# Patient Record
Sex: MALE | Race: WHITE | NOT HISPANIC OR LATINO | Employment: OTHER | URBAN - NONMETROPOLITAN AREA
[De-identification: names, ages, dates, MRNs, and addresses within clinical notes are randomized per-mention and may not be internally consistent; named-entity substitution may affect disease eponyms.]

---

## 2017-01-25 ENCOUNTER — TELEPHONE (OUTPATIENT)
Dept: CARDIOLOGY | Facility: CLINIC | Age: 62
End: 2017-01-25

## 2017-01-25 NOTE — TELEPHONE ENCOUNTER
"E-mail received to my  e-mail account on Monday, January 23, 2017 with the noted message:    \"I was diagnosed with 3 leg clots at [facility] what do i need to do\"     Initially I forwarded the message to [email or web address] to confirm legitimacy:  This is a legit email from [facility] My Chart.  Thanks  [facility]  [employer] Support/Service Desk  [address] [place] [address]    I then made telephone contact at which time I am informed of diagnosis of \"leg clots\" on 12/27/2016 at which time he was placed on Xarelto 15 mg bid for 21 days. He is currently maintained on Xarelto 20 mg daily and has planned follow up with PCP @ [facility].   Mr Cowart inquires as to the availability of Vascular services at  to which I confirm.     I assure  Mr Cowart that the above dose of Xarelto is appropriate for deep vein thrombosis. I strongly encourage him in maintaining follow up with his PCP.         "

## 2017-06-22 ENCOUNTER — APPOINTMENT (OUTPATIENT)
Dept: CT IMAGING | Facility: HOSPITAL | Age: 62
End: 2017-06-22

## 2017-06-22 ENCOUNTER — HOSPITAL ENCOUNTER (EMERGENCY)
Facility: HOSPITAL | Age: 62
Discharge: HOME | End: 2017-06-22
Attending: FAMILY MEDICINE | Admitting: FAMILY MEDICINE

## 2017-06-22 VITALS
RESPIRATION RATE: 18 BRPM | HEART RATE: 82 BPM | BODY MASS INDEX: 38.57 KG/M2 | WEIGHT: 240 LBS | OXYGEN SATURATION: 94 % | DIASTOLIC BLOOD PRESSURE: 63 MMHG | HEIGHT: 66 IN | SYSTOLIC BLOOD PRESSURE: 132 MMHG | TEMPERATURE: 98.2 F
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
## 2017-06-22 DIAGNOSIS — S00.03XA CONTUSION OF SCALP, INITIAL ENCOUNTER: ICD-10-CM

## 2017-06-22 PROCEDURE — 99284 EMERGENCY DEPT VISIT MOD MDM: CPT

## 2017-06-22 PROCEDURE — 25010000002 TDAP 5-2.5-18.5 LF-MCG/0.5 SUSPENSION: Performed by: FAMILY MEDICINE

## 2017-06-22 PROCEDURE — 90715 TDAP VACCINE 7 YRS/> IM: CPT | Performed by: FAMILY MEDICINE

## 2017-06-22 PROCEDURE — 90471 IMMUNIZATION ADMIN: CPT | Performed by: FAMILY MEDICINE

## 2017-06-22 PROCEDURE — 70450 CT HEAD/BRAIN W/O DYE: CPT

## 2017-06-22 PROCEDURE — 72125 CT NECK SPINE W/O DYE: CPT

## 2017-06-22 RX ORDER — ASPIRIN 325 MG
325 TABLET, DELAYED RELEASE (ENTERIC COATED) ORAL EVERY 6 HOURS PRN
Status: ON HOLD | COMMUNITY
End: 2018-09-18

## 2017-06-22 RX ORDER — ESOMEPRAZOLE MAGNESIUM 40 MG/1
40 CAPSULE, DELAYED RELEASE ORAL
COMMUNITY
End: 2019-01-24

## 2017-06-22 RX ORDER — DULOXETIN HYDROCHLORIDE 60 MG/1
60 CAPSULE, DELAYED RELEASE ORAL DAILY
COMMUNITY
End: 2020-10-12

## 2017-06-22 RX ORDER — HYDROCODONE BITARTRATE AND ACETAMINOPHEN 5; 325 MG/1; MG/1
1 TABLET ORAL EVERY 6 HOURS PRN
Qty: 15 TABLET | Refills: 0 | Status: ON HOLD | OUTPATIENT
Start: 2017-06-22 | End: 2018-09-18

## 2017-06-22 RX ORDER — ONDANSETRON 4 MG/1
4 TABLET, FILM COATED ORAL EVERY 8 HOURS PRN
Qty: 10 TABLET | Refills: 0 | Status: ON HOLD | OUTPATIENT
Start: 2017-06-22 | End: 2018-09-18

## 2017-06-22 RX ADMIN — TETANUS TOXOID, REDUCED DIPHTHERIA TOXOID AND ACELLULAR PERTUSSIS VACCINE, ADSORBED 0.5 ML: 5; 2.5; 8; 8; 2.5 SUSPENSION INTRAMUSCULAR at 19:35
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
## 2018-03-29 ENCOUNTER — TRANSCRIBE ORDERS (OUTPATIENT)
Dept: PODIATRY | Facility: CLINIC | Age: 63
End: 2018-03-29

## 2018-03-29 DIAGNOSIS — S92.354A CLOSED NONDISPLACED FRACTURE OF FIFTH METATARSAL BONE OF RIGHT FOOT, INITIAL ENCOUNTER: Primary | ICD-10-CM

## 2018-04-09 ENCOUNTER — OFFICE VISIT (OUTPATIENT)
Dept: PODIATRY | Facility: CLINIC | Age: 63
End: 2018-04-09

## 2018-04-09 VITALS
OXYGEN SATURATION: 94 % | SYSTOLIC BLOOD PRESSURE: 154 MMHG | BODY MASS INDEX: 38.86 KG/M2 | HEIGHT: 66 IN | DIASTOLIC BLOOD PRESSURE: 85 MMHG | WEIGHT: 241.8 LBS | HEART RATE: 85 BPM

## 2018-04-09 DIAGNOSIS — S92.354A CLOSED NONDISPLACED FRACTURE OF FIFTH METATARSAL BONE OF RIGHT FOOT, INITIAL ENCOUNTER: Primary | ICD-10-CM

## 2018-04-09 PROCEDURE — 28470 CLTX METATARSAL FX WO MNP EA: CPT | Performed by: PODIATRIST

## 2018-04-09 NOTE — PROGRESS NOTES
Herbert Cowart  1955  62 y.o. male     Patient presents today as a follow up from [facility] clinic on his right foot.    04/09/2018    Chief Complaint   Patient presents with   • Right Foot - [facility] follow-up       History of Present Illness    Herbert Cowart is a 62 y.o.male who presents to clinic with chief complaint of right foot injury.  Approximately 3 weeks ago he was squatting down fixing a doorway when he went to stand and felt a pop in his foot.  The sharp throbbing pain immediately.  He was unable to bear his full weight.  He proceeded to the urgent care where x-rays were taken.  He was diagnosed with a fifth metatarsal fracture and was given a fracture boot.  Is currently weightbearing as tolerated in a fracture boot.  He states that his pain is improving.  He currently rates it as a 2 out of 10.  He has no other pedal complaints.      Past Medical History:   Diagnosis Date   • Clotting disorder    • Hyperlipidemia    • Hypertension    • Plantar fasciitis          Past Surgical History:   Procedure Laterality Date   • VOCAL CORD BIOPSY     • WRIST SURGERY           Family History   Problem Relation Age of Onset   • Cancer Mother    • Osteoporosis Mother    • Psoriasis Mother    • Heart disease Father    • Diabetes Father    • Cancer Paternal Grandfather    • Diabetes Paternal Grandfather        No Known Allergies    Social History     Social History   • Marital status:      Spouse name: N/A   • Number of children: N/A   • Years of education: N/A     Occupational History   • Not on file.     Social History Main Topics   • Smoking status: Never Smoker   • Smokeless tobacco: Not on file   • Alcohol use No   • Drug use: No   • Sexual activity: Defer     Other Topics Concern   • Not on file     Social History Narrative   • No narrative on file         Current Outpatient Prescriptions   Medication Sig Dispense Refill   • aspirin  MG tablet Take 325 mg by mouth Every 6 (Six) Hours As  "Needed.     • ATENOLOL PO Take  by mouth.     • DULoxetine (CYMBALTA) 60 MG capsule Take 60 mg by mouth Daily.     • esomeprazole (nexIUM) 40 MG capsule Take 40 mg by mouth Every Morning Before Breakfast.     • HYDROcodone-acetaminophen (NORCO) 5-325 MG per tablet Take 1 tablet by mouth Every 6 (Six) Hours As Needed (pain). 15 tablet 0   • ondansetron (ZOFRAN) 4 MG tablet Take 1 tablet by mouth Every 8 (Eight) Hours As Needed for Nausea or Vomiting. 10 tablet 0   • Rosuvastatin Calcium (CRESTOR PO) Take  by mouth.       No current facility-administered medications for this visit.          OBJECTIVE    /85   Pulse 85   Ht 167.6 cm (66\")   Wt 110 kg (241 lb 12.8 oz)   SpO2 94%   BMI 39.03 kg/m²       Review of Systems   Constitutional: Negative for activity change and appetite change.   HENT: Positive for hearing loss. Negative for congestion and dental problem.    Eyes: Positive for visual disturbance. Negative for photophobia and redness.   Respiratory: Negative for apnea and chest tightness.    Cardiovascular: Negative for chest pain.   Gastrointestinal: Negative for abdominal distention and abdominal pain.   Endocrine: Negative for cold intolerance and heat intolerance.   Genitourinary: Negative for difficulty urinating and dysuria.   Musculoskeletal: Negative for arthralgias.        Right foot pain   Skin: Negative for color change and pallor.   Allergic/Immunologic: Negative for environmental allergies and food allergies.   Neurological: Negative for dizziness and facial asymmetry.   Hematological: Negative for adenopathy. Does not bruise/bleed easily.   Psychiatric/Behavioral: Negative for agitation and behavioral problems.           Physical Exam   Constitutional: He is oriented to person, place, and time. He appears well-developed and well-nourished. No distress.   HENT:   Head: Normocephalic and atraumatic.   Nose: Nose normal.   Eyes: Conjunctivae and EOM are normal. Pupils are equal, round, and " reactive to light.   Pulmonary/Chest: Effort normal. No respiratory distress. He has no wheezes.   Neurological: He is alert and oriented to person, place, and time.   Skin: He is not diaphoretic.   Psychiatric: He has a normal mood and affect. His behavior is normal.   Vitals reviewed.        Right Lower Extremity    Cardiovascular:    DP/PT pulses palpable    CFT brisk  to all digits  No erythema noted   Mild edema noted to right distal lateral foot    Musculoskeletal:  Muscle strength deferred   Pain on palpation to the distal right lateral foot    Dermatological:   Skin is warm, dry and intact      Webspaces 1 through 4 bilateral are clean, dry and intact    Neurological:   Protective sensation intact    Sensation intact to light touch      Procedures        ASSESSMENT AND PLAN    Herbert was seen today for [facility] follow-up.    Diagnoses and all orders for this visit:    Closed nondisplaced fracture of fifth metatarsal bone of right foot, initial encounter      - Comprehensive foot and ankle exam performed.   - Reviewed x-rays on disc.  Nondisplaced fifth metatarsal neck fracture.  No other acute osseous abnormality is noted.  - Continue weightbearing as tolerated in fracture boot  - Ice and elevate at home  - All questions were answered to the patients satisfaction.  - RTC 3 weeks for repeat x-rays          This document has been electronically signed by Salvador Birmingham DPM on April 10, 2018 9:17 AM     4/10/2018  9:17 AM

## 2018-04-30 ENCOUNTER — OFFICE VISIT (OUTPATIENT)
Dept: PODIATRY | Facility: CLINIC | Age: 63
End: 2018-04-30

## 2018-04-30 VITALS — HEIGHT: 66 IN | OXYGEN SATURATION: 94 % | WEIGHT: 241 LBS | HEART RATE: 74 BPM | BODY MASS INDEX: 38.73 KG/M2

## 2018-04-30 DIAGNOSIS — S92.354D CLOSED NONDISPLACED FRACTURE OF FIFTH METATARSAL BONE OF RIGHT FOOT WITH ROUTINE HEALING, SUBSEQUENT ENCOUNTER: Primary | ICD-10-CM

## 2018-04-30 PROCEDURE — 99024 POSTOP FOLLOW-UP VISIT: CPT | Performed by: PODIATRIST

## 2018-04-30 NOTE — PROGRESS NOTES
Herbert Cowart  1955  62 y.o. male     Patient presents today for recheck of his right foot with repeat x-rays.    04/30/2018     Chief Complaint   Patient presents with   • Right Foot - Follow-up       History of Present Illness    Patient presents to clinic today for follow-up of his right foot fracture.  He is currently weightbearing in a cam boot.  He denies pain today.    Past Medical History:   Diagnosis Date   • Closed nondisplaced fracture of fifth metatarsal bone of right foot with routine healing    • Clotting disorder    • Hyperlipidemia    • Hypertension    • Plantar fasciitis          Past Surgical History:   Procedure Laterality Date   • VOCAL CORD BIOPSY     • WRIST SURGERY           Family History   Problem Relation Age of Onset   • Cancer Mother    • Osteoporosis Mother    • Psoriasis Mother    • Heart disease Father    • Diabetes Father    • Cancer Paternal Grandfather    • Diabetes Paternal Grandfather        No Known Allergies    Social History     Social History   • Marital status:      Spouse name: N/A   • Number of children: N/A   • Years of education: N/A     Occupational History   • Not on file.     Social History Main Topics   • Smoking status: Never Smoker   • Smokeless tobacco: Not on file   • Alcohol use No   • Drug use: No   • Sexual activity: Defer     Other Topics Concern   • Not on file     Social History Narrative   • No narrative on file         Current Outpatient Prescriptions   Medication Sig Dispense Refill   • aspirin  MG tablet Take 325 mg by mouth Every 6 (Six) Hours As Needed.     • ATENOLOL PO Take  by mouth.     • DULoxetine (CYMBALTA) 60 MG capsule Take 60 mg by mouth Daily.     • esomeprazole (nexIUM) 40 MG capsule Take 40 mg by mouth Every Morning Before Breakfast.     • HYDROcodone-acetaminophen (NORCO) 5-325 MG per tablet Take 1 tablet by mouth Every 6 (Six) Hours As Needed (pain). 15 tablet 0   • ondansetron (ZOFRAN) 4 MG tablet Take 1 tablet by  "mouth Every 8 (Eight) Hours As Needed for Nausea or Vomiting. 10 tablet 0   • Rosuvastatin Calcium (CRESTOR PO) Take  by mouth.       No current facility-administered medications for this visit.          OBJECTIVE    Pulse 74   Ht 167.6 cm (66\")   Wt 109 kg (241 lb)   SpO2 94%   BMI 38.90 kg/m²       Review of Systems   Constitutional: Negative.    HENT: Negative for hearing loss.    Respiratory: Negative.    Cardiovascular: Negative.    Musculoskeletal: Negative.    Skin: Negative.    Psychiatric/Behavioral: Negative.            Physical Exam   Constitutional: He is oriented to person, place, and time. He appears well-developed and well-nourished.   Pulmonary/Chest: Effort normal. No respiratory distress. He has no wheezes.   Neurological: He is alert and oriented to person, place, and time.   Skin: He is not diaphoretic.   Psychiatric: He has a normal mood and affect. His behavior is normal.         Right Lower Extremity    Cardiovascular:    DP/PT pulses palpable    CFT brisk  to all digits  No erythema noted   No edema noted to right distal lateral foot    Musculoskeletal:  Muscle strength normal  Improved pain on palpation to the distal right lateral foot    Dermatological:   Skin is warm, dry and intact      Webspaces 1 through 4 bilateral are clean, dry and intact    Neurological:   Protective sensation intact    Sensation intact to light touch      Procedures        ASSESSMENT AND PLAN    Herbert was seen today for follow-up.    Diagnoses and all orders for this visit:    Closed nondisplaced fracture of fifth metatarsal bone of right foot with routine healing, subsequent encounter  -     XR Foot 3+ View Right      - X-rays taken and reviewed.  Healing fifth metatarsal neck fracture noted.  - Transition to regular shoe gear as tolerated  - All questions were answered to the patients satisfaction.  - RTC 2 months, repeat radiographs          This document has been electronically signed by Salvador Birmingham DPM " on April 30, 2018 10:45 AM     4/30/2018  10:45 AM

## 2018-09-18 ENCOUNTER — ANESTHESIA (OUTPATIENT)
Dept: GASTROENTEROLOGY | Facility: HOSPITAL | Age: 63
End: 2018-09-18

## 2018-09-18 ENCOUNTER — ANESTHESIA EVENT (OUTPATIENT)
Dept: GASTROENTEROLOGY | Facility: HOSPITAL | Age: 63
End: 2018-09-18

## 2018-09-18 ENCOUNTER — HOSPITAL ENCOUNTER (OUTPATIENT)
Facility: HOSPITAL | Age: 63
Setting detail: HOSPITAL OUTPATIENT SURGERY
Discharge: HOME | End: 2018-09-18
Attending: INTERNAL MEDICINE | Admitting: INTERNAL MEDICINE

## 2018-09-18 VITALS
BODY MASS INDEX: 37.61 KG/M2 | HEART RATE: 63 BPM | WEIGHT: 234 LBS | SYSTOLIC BLOOD PRESSURE: 127 MMHG | RESPIRATION RATE: 18 BRPM | TEMPERATURE: 97.7 F | DIASTOLIC BLOOD PRESSURE: 48 MMHG | OXYGEN SATURATION: 94 % | HEIGHT: 66 IN

## 2018-09-18 DIAGNOSIS — Z12.11 ENCOUNTER FOR SCREENING COLONOSCOPY: ICD-10-CM

## 2018-09-18 DIAGNOSIS — Z80.0 FH: GI CANCER: ICD-10-CM

## 2018-09-18 LAB — GLUCOSE BLDC GLUCOMTR-MCNC: 126 MG/DL (ref 70–130)

## 2018-09-18 PROCEDURE — 88305 TISSUE EXAM BY PATHOLOGIST: CPT | Performed by: INTERNAL MEDICINE

## 2018-09-18 PROCEDURE — 25010000002 PROPOFOL 10 MG/ML EMULSION: Performed by: NURSE ANESTHETIST, CERTIFIED REGISTERED

## 2018-09-18 PROCEDURE — 82962 GLUCOSE BLOOD TEST: CPT

## 2018-09-18 PROCEDURE — 88305 TISSUE EXAM BY PATHOLOGIST: CPT | Performed by: PATHOLOGY

## 2018-09-18 DEVICE — CLIPPING DEVICE
Type: IMPLANTABLE DEVICE | Site: SIGMOID COLON | Status: FUNCTIONAL
Brand: RESOLUTION CLIP

## 2018-09-18 RX ORDER — PROMETHAZINE HYDROCHLORIDE 25 MG/ML
12.5 INJECTION, SOLUTION INTRAMUSCULAR; INTRAVENOUS ONCE AS NEEDED
Status: DISCONTINUED | OUTPATIENT
Start: 2018-09-18 | End: 2018-09-18 | Stop reason: HOSPADM

## 2018-09-18 RX ORDER — LIDOCAINE HYDROCHLORIDE 10 MG/ML
INJECTION, SOLUTION INFILTRATION; PERINEURAL AS NEEDED
Status: DISCONTINUED | OUTPATIENT
Start: 2018-09-18 | End: 2018-09-18 | Stop reason: SURG

## 2018-09-18 RX ORDER — PROMETHAZINE HYDROCHLORIDE 25 MG/1
25 SUPPOSITORY RECTAL ONCE AS NEEDED
Status: DISCONTINUED | OUTPATIENT
Start: 2018-09-18 | End: 2018-09-18 | Stop reason: HOSPADM

## 2018-09-18 RX ORDER — CHOLECALCIFEROL (VITAMIN D3) 1250 MCG
CAPSULE ORAL
COMMUNITY
End: 2019-01-24

## 2018-09-18 RX ORDER — PROMETHAZINE HYDROCHLORIDE 25 MG/1
25 TABLET ORAL ONCE AS NEEDED
Status: DISCONTINUED | OUTPATIENT
Start: 2018-09-18 | End: 2018-09-18 | Stop reason: HOSPADM

## 2018-09-18 RX ORDER — ONDANSETRON 2 MG/ML
4 INJECTION INTRAMUSCULAR; INTRAVENOUS ONCE AS NEEDED
Status: DISCONTINUED | OUTPATIENT
Start: 2018-09-18 | End: 2018-09-18 | Stop reason: HOSPADM

## 2018-09-18 RX ORDER — DEXTROSE AND SODIUM CHLORIDE 5; .45 G/100ML; G/100ML
20 INJECTION, SOLUTION INTRAVENOUS CONTINUOUS
Status: DISCONTINUED | OUTPATIENT
Start: 2018-09-18 | End: 2018-09-18 | Stop reason: HOSPADM

## 2018-09-18 RX ORDER — PROPOFOL 10 MG/ML
VIAL (ML) INTRAVENOUS AS NEEDED
Status: DISCONTINUED | OUTPATIENT
Start: 2018-09-18 | End: 2018-09-18 | Stop reason: SURG

## 2018-09-18 RX ORDER — DEXAMETHASONE SODIUM PHOSPHATE 4 MG/ML
8 INJECTION, SOLUTION INTRA-ARTICULAR; INTRALESIONAL; INTRAMUSCULAR; INTRAVENOUS; SOFT TISSUE ONCE AS NEEDED
Status: DISCONTINUED | OUTPATIENT
Start: 2018-09-18 | End: 2018-09-18 | Stop reason: HOSPADM

## 2018-09-18 RX ORDER — ASPIRIN 81 MG/1
81 TABLET ORAL DAILY
COMMUNITY
End: 2023-02-08 | Stop reason: DRUGHIGH

## 2018-09-18 RX ADMIN — PROPOFOL 50 MG: 10 INJECTION, EMULSION INTRAVENOUS at 11:10

## 2018-09-18 RX ADMIN — PROPOFOL 100 MG: 10 INJECTION, EMULSION INTRAVENOUS at 10:55

## 2018-09-18 RX ADMIN — PROPOFOL 50 MG: 10 INJECTION, EMULSION INTRAVENOUS at 11:13

## 2018-09-18 RX ADMIN — PROPOFOL 50 MG: 10 INJECTION, EMULSION INTRAVENOUS at 11:00

## 2018-09-18 RX ADMIN — PROPOFOL 50 MG: 10 INJECTION, EMULSION INTRAVENOUS at 11:17

## 2018-09-18 RX ADMIN — DEXTROSE AND SODIUM CHLORIDE 20 ML/HR: 5; 450 INJECTION, SOLUTION INTRAVENOUS at 10:23

## 2018-09-18 RX ADMIN — LIDOCAINE HYDROCHLORIDE 50 MG: 10 INJECTION, SOLUTION INFILTRATION; PERINEURAL at 10:55

## 2018-09-18 RX ADMIN — PROPOFOL 50 MG: 10 INJECTION, EMULSION INTRAVENOUS at 11:05

## 2018-09-18 RX ADMIN — PROPOFOL 50 MG: 10 INJECTION, EMULSION INTRAVENOUS at 11:02

## 2018-09-18 NOTE — H&P
Joey Morrell DO,[facility]  Gastroenterology  Hepatology  Endoscopy  Board Certified in Internal Medicine and gastroenterology  [address], [address]  [place]. [address]  - [phone]   F - [phone]     GASTROENTEROLOGY HISTORY AND PHYSICAL  NOTE   JOEY MORRELL DO.         SUBJECTIVE:   9/18/2018    Name: Herbert Cowart  DOD: 1955        Chief Complaint:     Subjective : Personal history of colon polyps, family history of colon cancer in father    Patient is 62 y.o. male presents with desire for elective colonoscopy.      ROS/HISTORY/ CURRENT MEDICATIONS/OBJECTIVE/VS/PE:   Review of Systems:   Review of Systems    History:     Past Medical History:   Diagnosis Date   • Closed nondisplaced fracture of fifth metatarsal bone of right foot with routine healing    • Clotting disorder (CMS/HCC)    • Hyperlipidemia    • Hypertension    • Plantar fasciitis      Past Surgical History:   Procedure Laterality Date   • VOCAL CORD BIOPSY     • WRIST SURGERY       Family History   Problem Relation Age of Onset   • Cancer Mother    • Osteoporosis Mother    • Psoriasis Mother    • Heart disease Father    • Diabetes Father    • Cancer Paternal Grandfather    • Diabetes Paternal Grandfather      Social History   Substance Use Topics   • Smoking status: Never Smoker   • Smokeless tobacco: Not on file   • Alcohol use No     Prescriptions Prior to Admission   Medication Sig Dispense Refill Last Dose   • aspirin 81 MG EC tablet Take 81 mg by mouth Daily.   9/15/2018   • ATENOLOL PO Take 25 mg by mouth Daily.   9/18/2018 at Unknown time   • Cholecalciferol (VITAMIN D3) 30375 units capsule Take  by mouth Every 7 (Seven) Days.   Past Week at Unknown time   • DULoxetine (CYMBALTA) 60 MG capsule Take 60 mg by mouth Daily.   9/17/2018 at Unknown time   • esomeprazole (nexIUM) 40 MG capsule Take 40 mg by mouth Every Morning Before Breakfast.   Past Month at Unknown time   • metFORMIN (GLUCOPHAGE) 500 MG tablet  Take 500 mg by mouth Daily.   9/17/2018 at Unknown time   • Rosuvastatin Calcium (CRESTOR PO) Take 10 mg by mouth Daily.   9/17/2018     Allergies:  Patient has no known allergies.    I have reviewed the patients medical history, surgical history and family history in the available medical record system.     Current Medications:     Current Facility-Administered Medications   Medication Dose Route Frequency Provider Last Rate Last Dose   • dexamethasone (DECADRON) injection 8 mg  8 mg Intravenous Once PRN Zarina Bartlett CRNA       • dextrose 5 % and sodium chloride 0.45 % infusion  20 mL/hr Intravenous Continuous Judson Pereira DO 20 mL/hr at 09/18/18 1023 20 mL/hr at 09/18/18 1023   • meperidine (DEMEROL) injection 12.5 mg  12.5 mg Intravenous Q5 Min PRN Zarina Bartlett CRNA       • ondansetron (ZOFRAN) injection 4 mg  4 mg Intravenous Once PRN Zarina Bartlett CRNA       • promethazine (PHENERGAN) injection 12.5 mg  12.5 mg Intravenous Once PRN Zarina Bartlett CRNA        Or   • promethazine (PHENERGAN) injection 12.5 mg  12.5 mg Intramuscular Once PRN Zarina Bartlett CRNA        Or   • promethazine (PHENERGAN) suppository 25 mg  25 mg Rectal Once PRN Zarina Bartlett CRNA        Or   • promethazine (PHENERGAN) tablet 25 mg  25 mg Oral Once PRN Zarina Bartlett CRNA           Objective     Physical Exam:   Temp:  [97.4 °F (36.3 °C)] 97.4 °F (36.3 °C)  Heart Rate:  [84] 84  Resp:  [18] 18  BP: (180)/(90) 180/90    Physical Exam:  General Appearance:    Alert, cooperative, in no acute distress   Head:    Normocephalic, without obvious abnormality, atraumatic   Eyes:            Lids and lashes normal, conjunctivae and sclerae normal, no   icterus, no pallor, corneas clear, PERRLA   Ears:    Ears appear intact with no abnormalities noted   Throat:   No oral lesions, no thrush, oral mucosa moist   Neck:   No adenopathy, supple, trachea midline, no thyromegaly,  no     carotid bruit, no JVD   Back:     No kyphosis present, no scoliosis present, no skin lesions,       erythema or scars, no tenderness to percussion or                   palpation,   range of motion normal   Lungs:     Clear to auscultation,respirations regular, even and                   unlabored    Heart:    Regular rhythm and normal rate, normal S1 and S2, no            murmur, no gallop, no rub, no click   Breast Exam:    Deferred   Abdomen:     Normal bowel sounds, no masses, no organomegaly, soft        non-tender, non-distended, no guarding, no rebound                 tenderness   Genitalia:    Deferred   Extremities:   Moves all extremities well, no edema, no cyanosis, no              redness   Pulses:   Pulses palpable and equal bilaterally   Skin:   No bleeding, bruising or rash   Lymph nodes:   No palpable adenopathy   Neurologic:   Cranial nerves 2 - 12 grossly intact, sensation intact, DTR        present and equal bilaterally      Results Review:     No results found for: WBC, HGB, HCT, PLT          No results found for: LIPASE  No results found for: INR       Radiology Review:  Imaging Results (last 72 hours)     ** No results found for the last 72 hours. **           I reviewed the patient's new clinical results.  I reviewed the patient's new imaging results and agree with the interpretation.     ASSESSMENT/PLAN:   ASSESSMENT:   1.  Personal history of colon polyps  2.  Family history of colon cancer    PLAN:   1.  Colonoscopy    Risk and benefits associated with the procedure are reviewed with the patient.  The patient wishes to proceed      Judson Pereira DO  09/18/18  10:46 AM

## 2018-09-18 NOTE — ANESTHESIA PREPROCEDURE EVALUATION
Anesthesia Evaluation     NPO Solid Status: N/A  NPO Liquid Status: > 4 hours           Airway   Mallampati: I  TM distance: >3 FB  Neck ROM: limited  No difficulty expected  Dental - normal exam     Pulmonary - normal exam   Cardiovascular - normal exam        Neuro/Psych  (+) psychiatric history Depression,     GI/Hepatic/Renal/Endo    (+)   diabetes mellitus,     Musculoskeletal     Abdominal   (+) obese,    Substance History      OB/GYN          Other                        Anesthesia Plan    ASA 3     MAC     intravenous induction   Anesthetic plan, all risks, benefits, and alternatives have been provided, discussed and informed consent has been obtained with: patient and spouse/significant other.

## 2018-09-18 NOTE — ANESTHESIA POSTPROCEDURE EVALUATION
Patient: Herbert Cowart    Procedure Summary     Date:  09/18/18 Room / Location:  [facility] ENDOSCOPY 2 / [facility] ENDOSCOPY    Anesthesia Start:  1052 Anesthesia Stop:  1123    Procedure:  COLONOSCOPY (N/A ) Diagnosis:       Encounter for screening colonoscopy      FH: GI cancer      (Encounter for screening colonoscopy [Z12.11])      (FH: GI cancer [Z80.0])    Surgeon:  Judson Pereira DO Provider:  Zarina Bartlett CRNA    Anesthesia Type:  MAC ASA Status:  3          Anesthesia Type: MAC  Last vitals  BP   180/90 (09/18/18 1011)   Temp   97.4 °F (36.3 °C) (09/18/18 1011)   Pulse   84 (09/18/18 1011)   Resp   18 (09/18/18 1011)     SpO2   95 % (09/18/18 1011)     Post Anesthesia Care and Evaluation    Patient location during evaluation: bedside  Patient participation: waiting for patient participation  Level of consciousness: responsive to verbal stimuli  Pain management: adequate  Airway patency: patent  Anesthetic complications: No anesthetic complications    Cardiovascular status: acceptable  Respiratory status: acceptable, room air and spontaneous ventilation  Hydration status: acceptable

## 2018-09-19 LAB
LAB AP CASE REPORT: NORMAL
PATH REPORT.FINAL DX SPEC: NORMAL
PATH REPORT.GROSS SPEC: NORMAL

## 2019-01-24 ENCOUNTER — CONSULT (OUTPATIENT)
Dept: SLEEP MEDICINE | Facility: HOSPITAL | Age: 64
End: 2019-01-24

## 2019-01-24 VITALS
SYSTOLIC BLOOD PRESSURE: 167 MMHG | HEART RATE: 92 BPM | OXYGEN SATURATION: 97 % | WEIGHT: 238.1 LBS | DIASTOLIC BLOOD PRESSURE: 94 MMHG | HEIGHT: 66 IN | BODY MASS INDEX: 38.27 KG/M2

## 2019-01-24 DIAGNOSIS — F51.04 PSYCHOPHYSIOLOGICAL INSOMNIA: ICD-10-CM

## 2019-01-24 DIAGNOSIS — G47.33 OBSTRUCTIVE SLEEP APNEA, ADULT: Primary | ICD-10-CM

## 2019-01-24 PROCEDURE — 99203 OFFICE O/P NEW LOW 30 MIN: CPT | Performed by: INTERNAL MEDICINE

## 2019-01-24 NOTE — PROGRESS NOTES
New Patient Sleep Medicine Consultation    Encounter Date: 1/24/2019         Patient's PCP: Estela Mccullough APRN  Referring provider: No ref. provider found  Reason for consultation chief complaint: snoring, awakening gasping for breath, witnessed apneas and excessive daytime sleepiness    Herbert Cowart is a 63 y.o. male who admits to snoring, High blod pressure, stop breathing during sleep, sleeping less than 6 hours per night, sleepy driving, abnormal or violent dreams, restless legs at night, difficulty falling asleep and takes medicine to help go to sleep.   He denies cataplexy, sleep paralysis, or hypnagogic hallucinations. His bedtime is ~ 2200. He  falls asleep after 0-180 minutes, and is up 1 times per night. He wakes up ~ 9642-4062. He endorses 5-6 hours of sleep. He drinks 2 cups of coffee, 0 teas, and 2 sodas per day. He drinks 0 alcoholic beverages per week. He is not a current smoker. He has tried melatonin (no effect), Ambien (needed 3x the dose to be effective), and Tylenol PM (needed 3x dose to be effective) to help with sleep onset and maintenance insomnia in the past. He has no sleepiness with driving. He naps very rarely.    He retired in March. Prior was falling asleep right after work, now can't fall asleep at night. He had severe insomnia in the past.  He had PSG in 2012 (+), but did not get CPAP titration    [place] - [address]    Past Medical History:   Diagnosis Date   • Closed nondisplaced fracture of fifth metatarsal bone of right foot with routine healing    • Clotting disorder (CMS/HCC)    • Hyperlipidemia    • Hypertension    • Plantar fasciitis      Social History     Socioeconomic History   • Marital status:      Spouse name: Not on file   • Number of children: Not on file   • Years of education: Not on file   • Highest education level: Not on file   Social Needs   • Financial resource strain: Not on file   • Food insecurity - worry: Not on file   • Food insecurity -  "inability: Not on file   • Transportation needs - medical: Not on file   • Transportation needs - non-medical: Not on file   Occupational History   • Not on file   Tobacco Use   • Smoking status: Never Smoker   Substance and Sexual Activity   • Alcohol use: No   • Drug use: No   • Sexual activity: Defer   Other Topics Concern   • Not on file   Social History Narrative   • Not on file     Family History   Problem Relation Age of Onset   • Cancer Mother    • Osteoporosis Mother    • Psoriasis Mother    • Heart disease Father    • Diabetes Father    • Cancer Paternal Grandfather    • Diabetes Paternal Grandfather    , 1 kid  Retired from [employer] job  0 brothers and 0 sisters  Other family history + for: DM, MI, CABG in dad, mom with HTN, skin cancer  Smoking history: smoked never  FH of sleep disorders: self - diagnosed in 2012 but did not follow up for CPAP titration    Review of Systems:  Constitutional: negative  Eyes: negative  Ears, nose, mouth, throat, and face: negative  Respiratory: negative  Cardiovascular: negative  Gastrointestinal: negative  Genitourinary:negative  Integument/breast: negative  Hematologic/lymphatic: negative  Musculoskeletal:negative  Neurological: negative  Behavioral/Psych: negative  Endocrine: negative  Allergic/Immunologic: negative Patient advised to discuss any positive ROS with PCP.      Vitals:    01/24/19 0808   BP: 167/94   Pulse: 92   SpO2: 97%     Body mass index is 38.45 kg/m². Patient's Body mass index is 38.45 kg/m². BMI is above normal parameters. Recommendations include: referral to primary care.    Neck circumference: 18\"          General: Alert. Cooperative. Well developed. No acute distress.             Head:  Normocephalic. Symmetrical. Atraumatic.              Eyes: Sclera clear. No icterus. PERRLA. Normal EOM.             Ears: No deformities. Normal hearing.             Nose: No septal deviation. No drainage.          Throat: No oral lesions. No " thrush. Moist mucous membranes. Trachea midline    Tongue is enlarged    Dentition is fair with crowding       Pharynx: Posterior pharyngeal pillars are narrow    Mallampati score of IV (only hard palate visible)    Pharynx is normal with unrermarkable tonsils   Chest Wall:  Normal shape. Symmetric expansion with respiration. No tenderness.          Lungs:  Clear to auscultation bilaterally. No wheezes. No rhonchi. No rales. Respirations regular, even and unlabored.            Heart:  Regular rhythm and normal rate. Normal S1 and S2. No murmur.     Abdomen:  Soft, non-tender and non-distended. Normal bowel sounds. No masses.  Extremities:  Moves all extremities well. No edema.           Pulses: Pulses palpable and equal bilaterally.               Skin: Dry. Intact. No bleeding. No rash.           Neuro: Moves all 4 extremities and cranial nerves grossly intact.  Psychiatric: Normal mood and affect.      Current Outpatient Medications:   •  aspirin 81 MG EC tablet, Take 81 mg by mouth Daily., Disp: , Rfl:   •  ATENOLOL PO, Take 25 mg by mouth Daily., Disp: , Rfl:   •  DULoxetine (CYMBALTA) 60 MG capsule, Take 60 mg by mouth Daily., Disp: , Rfl:   •  metFORMIN (GLUCOPHAGE) 500 MG tablet, Take 500 mg by mouth Daily., Disp: , Rfl:   •  Rosuvastatin Calcium (CRESTOR PO), Take 10 mg by mouth Daily., Disp: , Rfl:     No results found for: WBC, RBC, HGB, HCT, MCV, MCH, MCHC, RDW, RDWSD, MPV, PLT, NEUTRORELPCT, LYMPHORELPCT, MONORELPCT, EOSRELPCT, BASORELPCT, AUTOIGPER, NEUTROABS, LYMPHSABS, MONOSABS, EOSABS, BASOSABS, AUTOIGNUM, NRBC    ASSESSMENT:  1. Obstructive sleep apnea   1. Script for autocpap 5-20 cm H2O  2. May need repeat split night study  2. Insomnia - chronic, moderate to severe  1. Address after starting on CPAP  2. Paradoxical rxn with benadryl  3. DM  4. Obesity  5. Depression - on cymbalta and stable    RTC in 3 months         This document has been electronically signed by Michele Otero MD on January 24,  2019         CC: Estela Mccullough, APRARNIE          No ref. provider found

## 2019-02-05 ENCOUNTER — HOSPITAL ENCOUNTER (OUTPATIENT)
Dept: SLEEP MEDICINE | Facility: HOSPITAL | Age: 64
Discharge: HOME | End: 2019-02-05
Attending: INTERNAL MEDICINE | Admitting: INTERNAL MEDICINE

## 2019-02-05 DIAGNOSIS — G47.33 OBSTRUCTIVE SLEEP APNEA, ADULT: ICD-10-CM

## 2019-02-05 DIAGNOSIS — G47.33 OSA (OBSTRUCTIVE SLEEP APNEA): ICD-10-CM

## 2019-02-05 DIAGNOSIS — G47.33 OSA (OBSTRUCTIVE SLEEP APNEA): Primary | ICD-10-CM

## 2019-02-05 PROCEDURE — 95806 SLEEP STUDY UNATT&RESP EFFT: CPT | Performed by: INTERNAL MEDICINE

## 2019-02-05 PROCEDURE — 95806 SLEEP STUDY UNATT&RESP EFFT: CPT

## 2019-02-15 DIAGNOSIS — G47.33 OBSTRUCTIVE SLEEP APNEA, ADULT: Primary | ICD-10-CM

## 2019-03-19 DIAGNOSIS — F51.04 PSYCHOPHYSIOLOGICAL INSOMNIA: Primary | ICD-10-CM

## 2019-03-19 RX ORDER — ZOLPIDEM TARTRATE 5 MG/1
5 TABLET ORAL NIGHTLY PRN
Qty: 30 TABLET | Refills: 3 | Status: SHIPPED | OUTPATIENT
Start: 2019-03-19 | End: 2019-04-30

## 2019-04-17 DIAGNOSIS — F51.04 PSYCHOPHYSIOLOGICAL INSOMNIA: Primary | ICD-10-CM

## 2019-04-30 ENCOUNTER — OFFICE VISIT (OUTPATIENT)
Dept: SLEEP MEDICINE | Facility: HOSPITAL | Age: 64
End: 2019-04-30

## 2019-04-30 VITALS
HEIGHT: 66 IN | DIASTOLIC BLOOD PRESSURE: 107 MMHG | SYSTOLIC BLOOD PRESSURE: 147 MMHG | HEART RATE: 92 BPM | WEIGHT: 233.6 LBS | BODY MASS INDEX: 37.54 KG/M2 | OXYGEN SATURATION: 97 %

## 2019-04-30 DIAGNOSIS — G47.33 OBSTRUCTIVE SLEEP APNEA, ADULT: Primary | ICD-10-CM

## 2019-04-30 DIAGNOSIS — F51.02: ICD-10-CM

## 2019-04-30 DIAGNOSIS — F51.04 PSYCHOPHYSIOLOGICAL INSOMNIA: ICD-10-CM

## 2019-04-30 PROCEDURE — 99214 OFFICE O/P EST MOD 30 MIN: CPT | Performed by: INTERNAL MEDICINE

## 2019-04-30 RX ORDER — NAPROXEN 500 MG/1
500 TABLET ORAL 2 TIMES DAILY
COMMUNITY
Start: 2019-03-18 | End: 2020-10-12

## 2019-04-30 NOTE — PROGRESS NOTES
"Sleep Clinic Follow Up    Date: 2019  Primary Care Physician: Estela Mccullough APRN    Last office visit: 2019 (I reviewed this note)    CC: Follow up: AMANDA, insomnia    Sleep Testin. HST on 2019, AHI of 15.4   2. Currently on 12-20 cm H2O    Assessment and Plan:    1. Obstructive sleep apnea Established, stable (1)  1. Compliant and improved with PAP therapy  2. Continue PAP as prescribed.   3. Script for PAP supplies  2. Insomnia - sleep onset and or maintenance Established, not controlled (2)   1. Continue trazodone for now (pcp)  2. [place] wake time at 8 am   3. No going to bed before 2am  4. Call me in 2 weeks  5. RTC in 6 months  3. Depression -- stable   1. On cymbalta       Interim History:  Since the last visit:    1) moderate AMANDA -  Herbert Cowart has remained compliant with CPAP. He denies mask and machine issues, dry mouth, headaches, or pressures intolerance. He denies abnormal dreams, sleep paralysis, nasal congestion, URI sx.    PAP Data:    Time frame: 10/25/2019 - 2019   Compliance 80.2 %  Average use on days used: 5hrs 15 min  Percent of days with usage greater than or equal to 4 hours: 55.1%  PAP range : 12-20 cm H2O  Average 90% pressure: 12.7 cmH2O  Leak: <1 minutes  Average AHI 2.9 events/hr    Bed time: 2200  Sleep latency: 3-4 hours  Number of times awakens during the night: 1  Wake time: not set but varies from 8979-2661  Estimated total sleep time at night: 4-5 hours  Caffeine intake: 2oz of coffee, 0oz of tea, and 24oz of decaf soda  Alcohol intake: <1 drinks per week  Nap time: none   Sleepiness with Driving: none    2) Insomnia -I discussed with the patient that I think this is a sleep state misperception.  He is able to stay alert throughout the day without any significant sleepiness or fatigue despite only getting \"5 hours\" per night.  He is in bed for up to 10 to 12 hours at a time.  I would expect that he would be awake for some of this time.  " However given that he has no daytime somnolence one can presume that his nighttime sleep quality and quantity.  I do not think sleeping pills will help him at this stage, and I recommended cognitive behavioral therapy for insomnia as a first-line step.  I will start with anchoring his wake up time and restricting his sleep to no more than 6 hours in bed.    PMHx, FH, SH reviewed and pertinent changes are: started on Trazodone by his pcp but with no effect.      REVIEW OF SYSTEMS:   Negative for chest pain, fever, cough, SOA, abdominal pain. Smoking:none        Exam:  Vitals:    04/30/19 1117   BP: (!) 147/107   Pulse: 92   SpO2: 97%           04/30/19  1117   Weight: 106 kg (233 lb 9.6 oz)     Body mass index is 37.72 kg/m². Patient's Body mass index is 37.72 kg/m². BMI is above normal parameters. Recommendations include: referral to primary care.      Gen:  No acute distress, alert, oriented  Lungs:  CTA with normal effort   CV:  RRR, no M/R/G  GI:  soft, non-tender  Psych:  Appropriate affect    Past Medical History:   Diagnosis Date   • Closed nondisplaced fracture of fifth metatarsal bone of right foot with routine healing    • Clotting disorder (CMS/HCC)    • Hyperlipidemia    • Hypertension    • Plantar fasciitis        Current Outpatient Medications:   •  aspirin 81 MG EC tablet, Take 81 mg by mouth Daily., Disp: , Rfl:   •  ATENOLOL PO, Take 25 mg by mouth Daily., Disp: , Rfl:   •  DULoxetine (CYMBALTA) 60 MG capsule, Take 60 mg by mouth Daily., Disp: , Rfl:   •  metFORMIN (GLUCOPHAGE) 500 MG tablet, Take 500 mg by mouth Daily., Disp: , Rfl:   •  Rosuvastatin Calcium (CRESTOR PO), Take 10 mg by mouth Daily., Disp: , Rfl:   •  naproxen (NAPROSYN) 500 MG tablet, Take 500 mg by mouth 2 (Two) Times a Day., Disp: , Rfl:     Total time 25 min, more than half spent in face to face counseling and coordination of care.    RTC in 6 months     This document has been electronically signed by Michele Otero MD on April  30, 2019         CC: Estela Mccullough, ESTELLE          No ref. provider found

## 2019-12-11 ENCOUNTER — HOSPITAL ENCOUNTER (OUTPATIENT)
Dept: MRI IMAGING | Facility: HOSPITAL | Age: 64
Discharge: HOME | End: 2019-12-11

## 2019-12-11 DIAGNOSIS — M25.511 RIGHT SHOULDER PAIN, UNSPECIFIED CHRONICITY: ICD-10-CM

## 2019-12-20 ENCOUNTER — TELEPHONE (OUTPATIENT)
Dept: GENERAL RADIOLOGY | Facility: HOSPITAL | Age: 64
End: 2019-12-20

## 2019-12-20 NOTE — TELEPHONE ENCOUNTER
Called doctors office to let them know the patient was a no show for appointment on 12/18/19 at 2:30pm for a MRI Shoulder Right WO Contrast spoke to chayito

## 2020-10-11 ENCOUNTER — HOSPITAL ENCOUNTER (EMERGENCY)
Facility: HOSPITAL | Age: 65
Discharge: HOME | End: 2020-10-12
Attending: EMERGENCY MEDICINE | Admitting: EMERGENCY MEDICINE

## 2020-10-11 DIAGNOSIS — R50.9 FEVER AND CHILLS: Primary | ICD-10-CM

## 2020-10-11 PROCEDURE — 99283 EMERGENCY DEPT VISIT LOW MDM: CPT

## 2020-10-11 RX ORDER — HYDROXYZINE HYDROCHLORIDE 10 MG/1
10 TABLET, FILM COATED ORAL NIGHTLY
COMMUNITY
End: 2023-02-08

## 2020-10-11 RX ORDER — VENLAFAXINE 50 MG/1
50 TABLET ORAL DAILY
COMMUNITY

## 2020-10-12 ENCOUNTER — TELEPHONE (OUTPATIENT)
Dept: OTHER | Facility: HOSPITAL | Age: 65
End: 2020-10-12

## 2020-10-12 VITALS
TEMPERATURE: 100.1 F | HEART RATE: 102 BPM | DIASTOLIC BLOOD PRESSURE: 58 MMHG | WEIGHT: 227 LBS | SYSTOLIC BLOOD PRESSURE: 119 MMHG | BODY MASS INDEX: 35.63 KG/M2 | HEIGHT: 67 IN | OXYGEN SATURATION: 96 % | RESPIRATION RATE: 18 BRPM

## 2020-10-12 LAB — SARS-COV-2 N GENE RESP QL NAA+PROBE: NOT DETECTED

## 2020-10-12 PROCEDURE — 87635 SARS-COV-2 COVID-19 AMP PRB: CPT | Performed by: EMERGENCY MEDICINE

## 2020-10-12 RX ORDER — ONDANSETRON 4 MG/1
4 TABLET, ORALLY DISINTEGRATING ORAL EVERY 6 HOURS PRN
Qty: 10 TABLET | Refills: 0 | Status: SHIPPED | OUTPATIENT
Start: 2020-10-12

## 2020-10-12 NOTE — DISCHARGE INSTRUCTIONS
Please return with new or worsening symptoms.  You will be contacted with the results of coronavirus testing.  Self quarantine as discussed.  Tylenol, Motrin for fever.  Drink plenty fluids and get plenty of rest.

## 2021-03-02 ENCOUNTER — IMMUNIZATION (OUTPATIENT)
Dept: VACCINE CLINIC | Facility: HOSPITAL | Age: 66
End: 2021-03-02

## 2021-03-02 PROCEDURE — 91300 HC SARSCOV02 VAC 30MCG/0.3ML IM: CPT | Performed by: THORACIC SURGERY (CARDIOTHORACIC VASCULAR SURGERY)

## 2021-03-02 PROCEDURE — 0001A: CPT | Performed by: THORACIC SURGERY (CARDIOTHORACIC VASCULAR SURGERY)

## 2021-03-23 ENCOUNTER — APPOINTMENT (OUTPATIENT)
Dept: VACCINE CLINIC | Facility: HOSPITAL | Age: 66
End: 2021-03-23

## 2021-03-26 ENCOUNTER — IMMUNIZATION (OUTPATIENT)
Dept: VACCINE CLINIC | Facility: HOSPITAL | Age: 66
End: 2021-03-26

## 2021-03-26 PROCEDURE — 0002A: CPT | Performed by: NURSE PRACTITIONER

## 2021-03-26 PROCEDURE — 91300 HC SARSCOV02 VAC 30MCG/0.3ML IM: CPT | Performed by: NURSE PRACTITIONER

## 2023-02-08 ENCOUNTER — CONSULT (OUTPATIENT)
Dept: ONCOLOGY | Facility: CLINIC | Age: 68
End: 2023-02-08
Payer: MEDICARE

## 2023-02-08 ENCOUNTER — LAB (OUTPATIENT)
Dept: ONCOLOGY | Facility: HOSPITAL | Age: 68
End: 2023-02-08
Payer: MEDICARE

## 2023-02-08 VITALS
SYSTOLIC BLOOD PRESSURE: 179 MMHG | WEIGHT: 204.8 LBS | BODY MASS INDEX: 32.08 KG/M2 | DIASTOLIC BLOOD PRESSURE: 66 MMHG | HEART RATE: 113 BPM | OXYGEN SATURATION: 98 % | TEMPERATURE: 97.5 F

## 2023-02-08 DIAGNOSIS — R79.89 ELEVATED LFTS: ICD-10-CM

## 2023-02-08 DIAGNOSIS — Z86.718 HISTORY OF DVT OF LOWER EXTREMITY: ICD-10-CM

## 2023-02-08 DIAGNOSIS — D69.6 THROMBOCYTOPENIA: Primary | ICD-10-CM

## 2023-02-08 DIAGNOSIS — Z11.59 ENCOUNTER FOR SCREENING FOR OTHER VIRAL DISEASES: ICD-10-CM

## 2023-02-08 DIAGNOSIS — D72.819 LEUKOPENIA, UNSPECIFIED TYPE: ICD-10-CM

## 2023-02-08 DIAGNOSIS — D69.6 THROMBOCYTOPENIA: ICD-10-CM

## 2023-02-08 LAB
ALBUMIN SERPL-MCNC: 4.1 G/DL (ref 3.5–5.2)
ALBUMIN/GLOB SERPL: 1.4 G/DL
ALP SERPL-CCNC: 179 U/L (ref 39–117)
ALT SERPL W P-5'-P-CCNC: 55 U/L (ref 1–41)
ANION GAP SERPL CALCULATED.3IONS-SCNC: 8 MMOL/L (ref 5–15)
AST SERPL-CCNC: 58 U/L (ref 1–40)
BASOPHILS # BLD AUTO: 0.02 10*3/MM3 (ref 0–0.2)
BASOPHILS NFR BLD AUTO: 0.5 % (ref 0–1.5)
BILIRUB SERPL-MCNC: 0.6 MG/DL (ref 0–1.2)
BUN SERPL-MCNC: 14 MG/DL (ref 8–23)
BUN/CREAT SERPL: 15.4 (ref 7–25)
CALCIUM SPEC-SCNC: 10.1 MG/DL (ref 8.6–10.5)
CHLORIDE SERPL-SCNC: 105 MMOL/L (ref 98–107)
CO2 SERPL-SCNC: 23 MMOL/L (ref 22–29)
CREAT SERPL-MCNC: 0.91 MG/DL (ref 0.76–1.27)
DEPRECATED RDW RBC AUTO: 40.2 FL (ref 37–54)
EGFRCR SERPLBLD CKD-EPI 2021: 92.4 ML/MIN/1.73
EOSINOPHIL # BLD AUTO: 0.08 10*3/MM3 (ref 0–0.4)
EOSINOPHIL NFR BLD AUTO: 2 % (ref 0.3–6.2)
ERYTHROCYTE [DISTWIDTH] IN BLOOD BY AUTOMATED COUNT: 11.7 % (ref 12.3–15.4)
GLOBULIN UR ELPH-MCNC: 3 GM/DL
GLUCOSE SERPL-MCNC: 218 MG/DL (ref 65–99)
HBV SURFACE AB SER RIA-ACNC: NORMAL
HBV SURFACE AG SERPL QL IA: NORMAL
HCT VFR BLD AUTO: 40.4 % (ref 37.5–51)
HCYS SERPL-MCNC: 12.2 UMOL/L (ref 0–15)
HGB BLD-MCNC: 14.6 G/DL (ref 13–17.7)
IMM GRANULOCYTES # BLD AUTO: 0 10*3/MM3 (ref 0–0.05)
IMM GRANULOCYTES NFR BLD AUTO: 0 % (ref 0–0.5)
LYMPHOCYTES # BLD AUTO: 1.39 10*3/MM3 (ref 0.7–3.1)
LYMPHOCYTES NFR BLD AUTO: 34.2 % (ref 19.6–45.3)
MCH RBC QN AUTO: 34 PG (ref 26.6–33)
MCHC RBC AUTO-ENTMCNC: 36.1 G/DL (ref 31.5–35.7)
MCV RBC AUTO: 94.2 FL (ref 79–97)
MONOCYTES # BLD AUTO: 0.31 10*3/MM3 (ref 0.1–0.9)
MONOCYTES NFR BLD AUTO: 7.6 % (ref 5–12)
NEUTROPHILS NFR BLD AUTO: 2.27 10*3/MM3 (ref 1.7–7)
NEUTROPHILS NFR BLD AUTO: 55.7 % (ref 42.7–76)
NRBC BLD AUTO-RTO: 0 /100 WBC (ref 0–0.2)
PLATELET # BLD AUTO: 99 10*3/MM3 (ref 140–450)
PLATELETS.RETICULATED NFR BLD AUTO: 3.5 % (ref 0.9–6.5)
PMV BLD AUTO: 10.3 FL (ref 6–12)
POTASSIUM SERPL-SCNC: 4.1 MMOL/L (ref 3.5–5.2)
PROT SERPL-MCNC: 7.1 G/DL (ref 6–8.5)
RBC # BLD AUTO: 4.29 10*6/MM3 (ref 4.14–5.8)
SODIUM SERPL-SCNC: 136 MMOL/L (ref 136–145)
WBC NRBC COR # BLD: 4.07 10*3/MM3 (ref 3.4–10.8)

## 2023-02-08 PROCEDURE — 85302 CLOT INHIBIT PROT C ANTIGEN: CPT | Performed by: INTERNAL MEDICINE

## 2023-02-08 PROCEDURE — 80053 COMPREHEN METABOLIC PANEL: CPT | Performed by: INTERNAL MEDICINE

## 2023-02-08 PROCEDURE — 85732 THROMBOPLASTIN TIME PARTIAL: CPT | Performed by: INTERNAL MEDICINE

## 2023-02-08 PROCEDURE — 99204 OFFICE O/P NEW MOD 45 MIN: CPT | Performed by: INTERNAL MEDICINE

## 2023-02-08 PROCEDURE — 85613 RUSSELL VIPER VENOM DILUTED: CPT | Performed by: INTERNAL MEDICINE

## 2023-02-08 PROCEDURE — 86147 CARDIOLIPIN ANTIBODY EA IG: CPT | Performed by: INTERNAL MEDICINE

## 2023-02-08 PROCEDURE — 85598 HEXAGNAL PHOSPH PLTLT NEUTRL: CPT | Performed by: INTERNAL MEDICINE

## 2023-02-08 PROCEDURE — 87340 HEPATITIS B SURFACE AG IA: CPT | Performed by: INTERNAL MEDICINE

## 2023-02-08 PROCEDURE — 85025 COMPLETE CBC W/AUTO DIFF WBC: CPT | Performed by: INTERNAL MEDICINE

## 2023-02-08 PROCEDURE — 1123F ACP DISCUSS/DSCN MKR DOCD: CPT | Performed by: INTERNAL MEDICINE

## 2023-02-08 PROCEDURE — 85306 CLOT INHIBIT PROT S FREE: CPT | Performed by: INTERNAL MEDICINE

## 2023-02-08 PROCEDURE — 83090 ASSAY OF HOMOCYSTEINE: CPT | Performed by: INTERNAL MEDICINE

## 2023-02-08 PROCEDURE — 85301 ANTITHROMBIN III ANTIGEN: CPT | Performed by: INTERNAL MEDICINE

## 2023-02-08 PROCEDURE — 1125F AMNT PAIN NOTED PAIN PRSNT: CPT | Performed by: INTERNAL MEDICINE

## 2023-02-08 PROCEDURE — 86706 HEP B SURFACE ANTIBODY: CPT | Performed by: INTERNAL MEDICINE

## 2023-02-08 PROCEDURE — 85305 CLOT INHIBIT PROT S TOTAL: CPT | Performed by: INTERNAL MEDICINE

## 2023-02-08 PROCEDURE — 86146 BETA-2 GLYCOPROTEIN ANTIBODY: CPT | Performed by: INTERNAL MEDICINE

## 2023-02-08 PROCEDURE — 85670 THROMBIN TIME PLASMA: CPT | Performed by: INTERNAL MEDICINE

## 2023-02-08 PROCEDURE — 81240 F2 GENE: CPT | Performed by: INTERNAL MEDICINE

## 2023-02-08 PROCEDURE — 85055 RETICULATED PLATELET ASSAY: CPT | Performed by: INTERNAL MEDICINE

## 2023-02-08 PROCEDURE — 85303 CLOT INHIBIT PROT C ACTIVITY: CPT | Performed by: INTERNAL MEDICINE

## 2023-02-08 PROCEDURE — 85705 THROMBOPLASTIN INHIBITION: CPT | Performed by: INTERNAL MEDICINE

## 2023-02-08 PROCEDURE — 85300 ANTITHROMBIN III ACTIVITY: CPT | Performed by: INTERNAL MEDICINE

## 2023-02-08 PROCEDURE — 86704 HEP B CORE ANTIBODY TOTAL: CPT | Performed by: INTERNAL MEDICINE

## 2023-02-08 PROCEDURE — G0463 HOSPITAL OUTPT CLINIC VISIT: HCPCS | Performed by: INTERNAL MEDICINE

## 2023-02-08 PROCEDURE — 81241 F5 GENE: CPT | Performed by: INTERNAL MEDICINE

## 2023-02-08 RX ORDER — NAPROXEN 500 MG/1
TABLET ORAL EVERY 12 HOURS SCHEDULED
COMMUNITY

## 2023-02-08 RX ORDER — DULOXETIN HYDROCHLORIDE 60 MG/1
60 CAPSULE, DELAYED RELEASE ORAL DAILY
COMMUNITY
End: 2023-02-08

## 2023-02-08 RX ORDER — GLIPIZIDE 5 MG/1
5 TABLET ORAL DAILY
COMMUNITY
Start: 2023-02-03

## 2023-02-08 RX ORDER — ASPIRIN 81 MG/1
TABLET ORAL EVERY 24 HOURS
COMMUNITY

## 2023-02-08 RX ORDER — HYDROXYZINE PAMOATE 50 MG/1
CAPSULE ORAL
COMMUNITY
Start: 2023-01-24

## 2023-02-08 RX ORDER — INFLUENZA A VIRUS A/MICHIGAN/45/2015 (H1N1) RECOMBINANT HEMAGGLUTININ ANTIGEN, INFLUENZA A VIRUS A/SINGAPORE/INFIMH-16-0019/2016 (H3N2) RECOMBINANT HEMAGGLUTININ ANTIGEN, INFLUENZA B VIRUS B/MARYLAND/15/2016 RECOMBINANT HEMAGGLUTININ ANTIGEN, AND INFLUENZA B VIRUS B/PHUKET/3073/2013 RECOMBINANT HEMAGGLUTININ ANTIGEN 45; 45; 45; 45 UG/.5ML; UG/.5ML; UG/.5ML; UG/.5ML
INJECTION INTRAMUSCULAR
COMMUNITY
End: 2023-02-08

## 2023-02-08 RX ORDER — FOLIC ACID 1 MG/1
1 TABLET ORAL DAILY
Qty: 90 TABLET | Refills: 1 | Status: SHIPPED | OUTPATIENT
Start: 2023-02-08

## 2023-02-08 RX ORDER — HYDROCODONE BITARTRATE AND ACETAMINOPHEN 7.5; 325 MG/1; MG/1
TABLET ORAL
COMMUNITY
Start: 2023-01-25

## 2023-02-08 RX ORDER — METHYLPREDNISOLONE 4 MG/1
TABLET ORAL
COMMUNITY
Start: 2023-01-25

## 2023-02-08 RX ORDER — BUPROPION HYDROCHLORIDE 150 MG/1
TABLET ORAL
COMMUNITY
End: 2023-02-08

## 2023-02-08 RX ORDER — TIZANIDINE 4 MG/1
4 TABLET ORAL NIGHTLY
COMMUNITY

## 2023-02-08 RX ORDER — TOBRAMYCIN 3 MG/ML
SOLUTION/ DROPS OPHTHALMIC
COMMUNITY

## 2023-02-08 RX ORDER — HYDROCHLOROTHIAZIDE 12.5 MG/1
CAPSULE, GELATIN COATED ORAL
COMMUNITY
Start: 2023-01-06 | End: 2023-02-08

## 2023-02-08 RX ORDER — LOSARTAN POTASSIUM 50 MG/1
50 TABLET ORAL DAILY
COMMUNITY

## 2023-02-08 NOTE — PROGRESS NOTES
DATE OF CONSULT: 2/9/2023    REQUESTING SOURCE:        REASON FOR CONSULTATION: Thrombocytopenia, leukopenia, recurrent DVT of right lower extremity      HISTORY OF PRESENT ILLNESS:    67-year-old male with medical problem consisting of hypertension, dyslipidemia, borderline diabetes mellitus, history of DVT x2 has been referred to [facility] for further evaluation and recommendation regarding abnormal CBC showing thrombocytopenia, leukopenia and anemia.  Patient states he had COVID infection around [place] about 10 days prior to his last blood work.  States he has total 4 episode of COVID infection despite of getting vaccination.  Admits to history of 2 episode of DVT involving right lower extremity.  Initial episode was in early 1990s when he was on vacation receiving blood thinner for few weeks.  Patient was diagnosed with second episode of right lower extremity DVT on December 16, 2016 for which he received Xarelto for few months until resolution of DVT.  States his mother has been diagnosed with DVT recently.  Denies any personal history of malignancy.  Admits to recent intentional weight loss secondary to diabetes mellitus and dietary changes.  Complains of tingling and numbness affecting right index finger since back surgery about 1 month ago.  Complains of numbness affecting mouth.  Denies any new lymph node enlargement.  Denies any drenching night sweats.  Denies any bleeding.            PAST MEDICAL HISTORY:    Past Medical History:   Diagnosis Date   • Closed nondisplaced fracture of fifth metatarsal bone of right foot with routine healing    • Clotting disorder (HCC)    • Hyperlipidemia    • Hypertension    • Plantar fasciitis        PAST SURGICAL HISTORY:  Past Surgical History:   Procedure Laterality Date   • COLONOSCOPY N/A 9/18/2018    Procedure: COLONOSCOPY;  Surgeon: Judson Pereira DO;  Location: [facility] ENDOSCOPY;  Service: Gastroenterology   • VOCAL CORD BIOPSY     • WRIST SURGERY          ALLERGIES:    No Known Allergies    SOCIAL HISTORY:   Social History     Tobacco Use   • Smoking status: Never   Substance Use Topics   • Alcohol use: No   • Drug use: No       CURRENT MEDICATIONS:    Current Outpatient Medications   Medication Sig Dispense Refill   • aspirin 81 MG EC tablet Daily.     • ATENOLOL PO Take 25 mg by mouth Daily.     • diclofenac sodium (VOTAREN XR) 100 MG 24 hr tablet diclofenac  mg tablet,extended release 24 hr   TAKE 1 TABLET BY MOUTH ONCE DAILY WITH MEALS     • esomeprazole (nexIUM) 20 MG capsule Take 20 mg by mouth.     • folic acid (FOLVITE) 1 MG tablet Take 1 tablet by mouth Daily. 90 tablet 1   • glipizide (GLUCOTROL) 5 MG tablet Take 5 mg by mouth Daily.     • HYDROcodone-acetaminophen (NORCO) 7.5-325 MG per tablet      • hydrOXYzine pamoate (VISTARIL) 50 MG capsule      • losartan (COZAAR) 50 MG tablet Take 50 mg by mouth Daily.     • metFORMIN (GLUCOPHAGE) 500 MG tablet Take 500 mg by mouth Daily With Breakfast.     • methylPREDNISolone (MEDROL) 4 MG dose pack      • naproxen (NAPROSYN) 500 MG tablet Every 12 (Twelve) Hours.     • ondansetron ODT (ZOFRAN-ODT) 4 MG disintegrating tablet Place 1 tablet on the tongue Every 6 (Six) Hours As Needed for Nausea or Vomiting. 10 tablet 0   • Rosuvastatin Calcium (CRESTOR PO) Take 10 mg by mouth Daily.     • tiZANidine (ZANAFLEX) 4 MG tablet Take 4 mg by mouth Every Night.     • tobramycin 0.3 % solution ophthalmic solution tobramycin 0.3 % eye drops   INSTILL 2 DROPS INTO RIGHT EYE EVERY 6 HOURS FOR 7 DAYS     • venlafaxine (EFFEXOR) 50 MG tablet Take 50 mg by mouth Daily.       No current facility-administered medications for this visit.        HOME MEDICATIONS:   Current Outpatient Medications on File Prior to Visit   Medication Sig Dispense Refill   • aspirin 81 MG EC tablet Daily.     • ATENOLOL PO Take 25 mg by mouth Daily.     • diclofenac sodium (VOTAREN XR) 100 MG 24 hr tablet diclofenac  mg tablet,extended  release 24 hr   TAKE 1 TABLET BY MOUTH ONCE DAILY WITH MEALS     • esomeprazole (nexIUM) 20 MG capsule Take 20 mg by mouth.     • glipizide (GLUCOTROL) 5 MG tablet Take 5 mg by mouth Daily.     • HYDROcodone-acetaminophen (NORCO) 7.5-325 MG per tablet      • hydrOXYzine pamoate (VISTARIL) 50 MG capsule      • losartan (COZAAR) 50 MG tablet Take 50 mg by mouth Daily.     • metFORMIN (GLUCOPHAGE) 500 MG tablet Take 500 mg by mouth Daily With Breakfast.     • methylPREDNISolone (MEDROL) 4 MG dose pack      • naproxen (NAPROSYN) 500 MG tablet Every 12 (Twelve) Hours.     • ondansetron ODT (ZOFRAN-ODT) 4 MG disintegrating tablet Place 1 tablet on the tongue Every 6 (Six) Hours As Needed for Nausea or Vomiting. 10 tablet 0   • Rosuvastatin Calcium (CRESTOR PO) Take 10 mg by mouth Daily.     • tiZANidine (ZANAFLEX) 4 MG tablet Take 4 mg by mouth Every Night.     • tobramycin 0.3 % solution ophthalmic solution tobramycin 0.3 % eye drops   INSTILL 2 DROPS INTO RIGHT EYE EVERY 6 HOURS FOR 7 DAYS     • venlafaxine (EFFEXOR) 50 MG tablet Take 50 mg by mouth Daily.       No current facility-administered medications on file prior to visit.       FAMILY HISTORY:    Family History   Problem Relation Age of Onset   • Cancer Mother    • Osteoporosis Mother    • Psoriasis Mother    • Heart disease Father    • Diabetes Father    • Cancer Paternal Grandfather    • Diabetes Paternal Grandfather        REVIEW OF SYSTEMS:        CONSTITUTIONAL:  Complains of fatigue.  Positive for intentional weight loss recently.  Denies any fever, chills.    EYES: No visual disturbances. No discharge. No new lesion.    ENMT:  No epistaxis, mouth sores or difficulty swallowing.    RESPIRATORY:  No new shortness of breath. No new cough or hemoptysis.    CARDIOVASCULAR:  No chest pain or palpitations.    GASTROINTESTINAL:  No abdominal pain nausea, vomiting or blood in the stool.    GENITOURINARY: No dysuria or hematuria.    MUSCULOSKELETAL: Positive for  back pain for which she had a back surgery done recently.    LYMPHATICS:  Denies any abnormal swollen glands anywhere in the body.    NEUROLOGICAL : Complains of tingling and numbness affecting right index finger. No headache or dizziness. No seizures or balance problems.    SKIN: No new skin lesion.    ENDOCRINE : No new hot or cold intolerance. No new polyuria. No polydipsia.        PHYSICAL EXAMINATION:      VITAL SIGNS:  /66   Pulse 113   Temp 97.5 °F (36.4 °C)   Wt 92.9 kg (204 lb 12.8 oz)   SpO2 98%   BMI 32.08 kg/m²       02/08/23  1317   Weight: 92.9 kg (204 lb 12.8 oz)       ECOG performance status: 1    CONSTITUTIONAL:  Not in any distress.    EYES: Mild conjunctival pallor. No Icterus. No pterygium. Extraocular movements intact. No ptosis.    ENMT:  Normocephalic, atraumatic. No facial asymmetry noted.    NECK:  No adenopathy. Trachea midline. No JVD.    RESPIRATORY:  Fair air entry bilateral. No rhonchi or wheezing. Fair respiratory effort.    CARDIOVASCULAR:  S1, S2. Regular rate and rhythm. No murmur or gallop appreciated.    ABDOMEN:  Soft, obese, nontender. Bowel sounds present in all four quadrants.  No hepatosplenomegaly appreciated.    MUSCULOSKELETAL:  No edema. No calf tenderness. Normal range of motion.    NEUROLOGIC:    No motor  deficit appreciated. Cranial nerves II-XII grossly intact.    SKIN : No new skin lesion identified. Skin is warm and dry to touch.    LYMPHATICS: No new enlarged lymph nodes in neck or supraclavicular area.    PSYCHIATRY: Alert, awake and oriented ×3. Normal affect.  Normal judgment.  Makes good eye contact.          DIAGNOSTIC DATA:    WBC   Date Value Ref Range Status   02/08/2023 4.07 3.40 - 10.80 10*3/mm3 Final   07/13/2019 4.8 4.1 - 10.9 THOUS/uL Final     RBC   Date Value Ref Range Status   02/08/2023 4.29 4.14 - 5.80 10*6/mm3 Final   07/13/2019 4.94 3.35 - 5.50 MIL/uL Final     Hemoglobin   Date Value Ref Range Status   02/08/2023 14.6 13.0 - 17.7  g/dL Final   07/13/2019 16.4 12.9 - 16.6 GM/DL Final     Hematocrit   Date Value Ref Range Status   02/08/2023 40.4 37.5 - 51.0 % Final   07/13/2019 47.9 38.0 - 48.0 % Final     MCV   Date Value Ref Range Status   02/08/2023 94.2 79.0 - 97.0 fL Final   07/13/2019 97.0 (H) 81.0 - 95.0 FL Final     MCH   Date Value Ref Range Status   02/08/2023 34.0 (H) 26.6 - 33.0 pg Final   07/13/2019 33.2 (H) 27.0 - 33.0 PG Final     MCHC   Date Value Ref Range Status   02/08/2023 36.1 (H) 31.5 - 35.7 g/dL Final   07/13/2019 34.2 33.0 - 37.0 G/DL Final     RDW   Date Value Ref Range Status   02/08/2023 11.7 (L) 12.3 - 15.4 % Final   07/13/2019 11.7 11.5 - 14.5 % Final     RDW-SD   Date Value Ref Range Status   02/08/2023 40.2 37.0 - 54.0 fl Final   07/13/2019 41.8 35.0 - 42.0 FL Final     MPV   Date Value Ref Range Status   02/08/2023 10.3 6.0 - 12.0 fL Final   07/13/2019 10.5 (H) 7.4 - 10.4 FL Final     Platelets   Date Value Ref Range Status   02/08/2023 99 (L) 140 - 450 10*3/mm3 Final   07/13/2019 151 130 - 400 THOUS/uL Final     Neutrophil Rel %   Date Value Ref Range Status   07/13/2019 51.6 42.2 - 75.2 % Final     Neutrophil %   Date Value Ref Range Status   02/08/2023 55.7 42.7 - 76.0 % Final     Lymphocyte Rel %   Date Value Ref Range Status   07/13/2019 39.0 20.5 - 51.1 % Final     Lymphocyte %   Date Value Ref Range Status   02/08/2023 34.2 19.6 - 45.3 % Final     Monocyte Rel %   Date Value Ref Range Status   07/13/2019 7.3 1.7 - 9.3 % Final     Monocyte %   Date Value Ref Range Status   02/08/2023 7.6 5.0 - 12.0 % Final     Eosinophil %   Date Value Ref Range Status   02/08/2023 2.0 0.3 - 6.2 % Final   07/13/2019 1.3 1.0 - 3.0 % Final     Basophil Rel %   Date Value Ref Range Status   07/13/2019 0.6 0.0 - 2.0 % Final     Basophil %   Date Value Ref Range Status   02/08/2023 0.5 0.0 - 1.5 % Final     Immature Grans %   Date Value Ref Range Status   02/08/2023 0.0 0.0 - 0.5 % Final   07/13/2019 0.2 0.0 - 0.4 % Final      Comment:     IG PARAMETER REFLECTS THE  COMBINATION OF METAMYELOCYTES,  MYELOCYTES, AND PROMYELOCYTES.     Neutrophils Absolute   Date Value Ref Range Status   07/13/2019 2.5 1.7 - 8.7 THOUS/uL Final     Neutrophils, Absolute   Date Value Ref Range Status   02/08/2023 2.27 1.70 - 7.00 10*3/mm3 Final     Lymphocytes Absolute   Date Value Ref Range Status   07/13/2019 1.9 0.8 - 5.6 THOUS/uL Final     Lymphocytes, Absolute   Date Value Ref Range Status   02/08/2023 1.39 0.70 - 3.10 10*3/mm3 Final     Monocytes Absolute   Date Value Ref Range Status   07/13/2019 0.4 0.1 - 1.0 THOUS/uL Final     Monocytes, Absolute   Date Value Ref Range Status   02/08/2023 0.31 0.10 - 0.90 10*3/mm3 Final     Eosinophils Absolute   Date Value Ref Range Status   07/13/2019 0.1 0.0 - 0.3 THOUS/uL Final     Eosinophils, Absolute   Date Value Ref Range Status   02/08/2023 0.08 0.00 - 0.40 10*3/mm3 Final     Basophils Absolute   Date Value Ref Range Status   07/13/2019 0.0 0.0 - 0.2 THOUS/uL Final     Basophils, Absolute   Date Value Ref Range Status   02/08/2023 0.02 0.00 - 0.20 10*3/mm3 Final     Immature Grans, Absolute   Date Value Ref Range Status   02/08/2023 0.00 0.00 - 0.05 10*3/mm3 Final   07/13/2019 0.01 0.00 - 0.04 THOUS/uL Final     nRBC   Date Value Ref Range Status   02/08/2023 0.0 0.0 - 0.2 /100 WBC Final     Glucose   Date Value Ref Range Status   02/08/2023 218 (H) 65 - 99 mg/dL Final     Sodium   Date Value Ref Range Status   02/08/2023 136 136 - 145 mmol/L Final   01/05/2023 141 136 - 145 mmol/L Final     Potassium   Date Value Ref Range Status   02/08/2023 4.1 3.5 - 5.2 mmol/L Final   01/05/2023 4.4 3.5 - 5.1 mmol/L Final     CO2   Date Value Ref Range Status   02/08/2023 23.0 22.0 - 29.0 mmol/L Final   07/13/2019 25 20 - 33 MMOL/L Final     Total CO2   Date Value Ref Range Status   01/05/2023 25 21 - 31 mmol/L Final     Chloride   Date Value Ref Range Status   02/08/2023 105 98 - 107 mmol/L Final   01/05/2023 106 98 - 107  mmol/L Final     Anion Gap   Date Value Ref Range Status   02/08/2023 8.0 5.0 - 15.0 mmol/L Final   01/05/2023 10 4 - 12 mmol/L Final     Creatinine   Date Value Ref Range Status   02/08/2023 0.91 0.76 - 1.27 mg/dL Final   01/05/2023 1.0 0.7 - 1.3 mg/dL Final     BUN   Date Value Ref Range Status   02/08/2023 14 8 - 23 mg/dL Final   01/05/2023 16 7 - 25 mg/dL Final     BUN/Creatinine Ratio   Date Value Ref Range Status   02/08/2023 15.4 7.0 - 25.0 Final     Calcium   Date Value Ref Range Status   02/08/2023 10.1 8.6 - 10.5 mg/dL Final   01/05/2023 9.7 8.6 - 10.3 mg/dL Final     Alkaline Phosphatase   Date Value Ref Range Status   02/08/2023 179 (H) 39 - 117 U/L Final     Total Protein   Date Value Ref Range Status   02/08/2023 7.1 6.0 - 8.5 g/dL Final     ALT (SGPT)   Date Value Ref Range Status   02/08/2023 55 (H) 1 - 41 U/L Final     AST (SGOT)   Date Value Ref Range Status   02/08/2023 58 (H) 1 - 40 U/L Final     Total Bilirubin   Date Value Ref Range Status   02/08/2023 0.6 0.0 - 1.2 mg/dL Final     Albumin   Date Value Ref Range Status   02/08/2023 4.1 3.5 - 5.2 g/dL Final     Globulin   Date Value Ref Range Status   02/08/2023 3.0 gm/dL Final     No results found for: IRON, TIBC, LABIRON, FERRITIN, [id number], FOLATE  No results found for: , LABCA2, AFPTM, HCGQUANT, , CHROMGRNA, [id number], CEA, REFLABREPO]                        Radiology Data :  Doppler ultrasound done on December 30, 2016 showed:    Impression      Thrombus in the peroneal vein, popliteal , and lower superficial femoral veins      No radiology results for the last 7 days    Pathology :  * Cannot find OR log *    Assessment and plan:    1.  Thrombocytopenia:  - Patient has been having ongoing thrombocytopenia since November 2022.  - Platelet count in November 2022 was 104,000  - Recent platelet count on January 7, 2023 is 108,000   -Iron studies and B12 studies on January 7, 2023 does not show any significant nutritional  deficiency  - Folate level is 8, recommend starting folic acid p.o. daily  - Differential diagnosis for patient with thrombocytopenia includes infectious etiology like hepatitis B versus immune destruction like ITP versus hepatosplenomegaly versus bone marrow pathology  - Recommend repeating blood work today consisting of CBC with differential, immature platelet fraction, CMP, hepatitis B serology.  - Platelet count is 99,000.  Liver enzymes are elevated.  Recommend getting ultrasound of liver to rule out any developing cirrhosis or fatty liver with splenomegaly.  - Immature platelet fraction is normal at 3.5.  - We will have patient return to clinic in 1 to 2 weeks to go over the results and further recommendation.    2.  Leukopenia:  - Most recent white blood cell count is 3.4 with ANC of 1.8  - Patient does not have any history of chronic leukopenia prior to that  - White blood cell count is normal at 4.07 with ANC of 2.27.    3.  Anemia:  - Most recent hemoglobin was 13.8.  Normal hemoglobin in patient's lab was 14.  - As mentioned earlier in the note there is no significant nutritional deficiency except for borderline folate for which recommend starting folic acid p.o. daily    4.  Recurrent DVT of right lower extremity  - Patient initially had a DVT of right lower extremity in 1990s for which he received anticoagulation for few weeks.  - Patient had a recurrence of right lower extremity DVT in December 2016 for which he received Xarelto for few months until resolution of DVT.  - Patient's mother also has been diagnosed with DVT recently.  - Recommend hypercoagulable work-up consisting of factor V Leyden, prothrombin gene mutation, anticardiolipin antibody, beta-2 glycoprotein antibodies, Antithrombin 3, protein C, protein S, lupus anticoagulant and homocystine level today.  - Remains on aspirin 81 mg p.o. daily  - Also discussed with patient anyone with second episode of DVT is recommended to have lifelong  anticoagulation.  - We will have further discussion upon clinic visit in 1 week.    5.  Elevated liver function test:  - Differential for elevated LFTs includes medication plus or minus fatty liver plus or minus obstruction  - Recommend getting ultrasound of abdomen to evaluate liver and spleen size and architecture in view of thrombocytopenia and elevated LFT.  Ultrasound of abdomen has been ordered today.    6.  Hypertension    7.  Diabetes mellitus    8.  Anxiety/depression      9.  Health maintenance: Patient does not smoke.  Had a colonoscopy in 2018.    10. Advance Care Planning: For now patient remains full code and is able to make decisions.  Patient has health care surrogate mentioned on chart.          PHQ-9 Total Score: 0   -Patient is not homicidal or suicidal.  No acute intervention required.      Herbert Cowart reports a pain score of 2.  Given his pain assessment as noted, treatment options were discussed and the following options were decided upon as a follow-up plan to address the patient's pain: continuation of current treatment plan for pain.             Thank you for this consultation.    David Estevez MD  2/9/2023  07:23 CST        Part of this note may be an electronic transcription/translation of spoken language to printed text using the Dragon Dictation System.

## 2023-02-09 ENCOUNTER — TELEPHONE (OUTPATIENT)
Dept: ONCOLOGY | Facility: CLINIC | Age: 68
End: 2023-02-09
Payer: MEDICARE

## 2023-02-09 LAB
CARDIOLIPIN IGA SER IA-ACNC: <9 APL U/ML (ref 0–11)
CARDIOLIPIN IGG SER IA-ACNC: 31 GPL U/ML (ref 0–14)
CARDIOLIPIN IGM SER IA-ACNC: 22 MPL U/ML (ref 0–12)
HBV CORE AB SERPL QL IA: NEGATIVE

## 2023-02-09 NOTE — TELEPHONE ENCOUNTER
Pt calls for lab results, lab results given to him along with the need for an ultrasound. V/u obtained.

## 2023-02-09 NOTE — TELEPHONE ENCOUNTER
----- Message from David Estevez MD sent at 2/9/2023  7:27 AM CST -----  Please let patient know, his white blood cell count and hemoglobin was normal today.  Platelet count while still low at 99,000.  Liver enzymes were elevated in blood today.  Recommend getting ultrasound of abdomen to evaluate liver and spleen size and architecture in view of thrombocytopenia and elevated liver function test.  Recommend scheduling ultrasound of abdomen for next week which has been ordered today.  Remaining blood work is still pending.  We will go over remaining blood work result next week.  Thank you

## 2023-02-10 LAB
APTT HEX PL PPP: 8 SEC (ref 0–11)
APTT SCREEN TO CONFIRM RATIO: 1.06 RATIO (ref 0–1.34)
APTT-LA 1H NP PPP: 41.9 SEC (ref 0–40.5)
AT III ACT/NOR PPP CHRO: 96 % (ref 75–135)
AT III AG ACT/NOR PPP IA: 88 % (ref 72–124)
B2 GLYCOPROT1 IGA SER-ACNC: <9 GPI IGA UNITS (ref 0–25)
B2 GLYCOPROT1 IGG SER-ACNC: <9 GPI IGG UNITS (ref 0–20)
B2 GLYCOPROT1 IGM SER-ACNC: 47 GPI IGM UNITS (ref 0–32)
CONFIRM APTT/NORMAL: 41.9 SEC (ref 0–47.6)
F5 GENE MUT ANL BLD/T: NORMAL
FACTOR II, DNA ANALYSIS: NORMAL
LA 2 SCREEN W REFLEX-IMP: ABNORMAL
MIXING APTT: 40.5 SEC (ref 0–40.5)
PROT C ACT/NOR PPP: 74 % (ref 73–180)
PROT S ACT/NOR PPP: 54 % (ref 63–140)
SCREEN APTT: 46.3 SEC (ref 0–43.5)
SCREEN DRVVT: 42.4 SEC (ref 0–47)
THROMBIN TIME: 16.7 SEC (ref 0–23)

## 2023-02-14 ENCOUNTER — TELEPHONE (OUTPATIENT)
Dept: ONCOLOGY | Facility: CLINIC | Age: 68
End: 2023-02-14
Payer: MEDICARE

## 2023-02-14 ENCOUNTER — HOSPITAL ENCOUNTER (OUTPATIENT)
Dept: ULTRASOUND IMAGING | Facility: HOSPITAL | Age: 68
Discharge: HOME | End: 2023-02-14
Admitting: INTERNAL MEDICINE
Payer: MEDICARE

## 2023-02-14 DIAGNOSIS — D69.6 THROMBOCYTOPENIA: ICD-10-CM

## 2023-02-14 DIAGNOSIS — R79.89 ELEVATED LFTS: ICD-10-CM

## 2023-02-14 PROCEDURE — 76700 US EXAM ABDOM COMPLETE: CPT

## 2023-02-15 LAB
PROT C AG ACT/NOR PPP IA: 63 % (ref 60–150)
PROT S AG ACT/NOR PPP IA: 112 % (ref 60–150)
PROT S FREE AG ACT/NOR PPP IA: 97 % (ref 61–136)

## 2023-02-21 ENCOUNTER — OFFICE VISIT (OUTPATIENT)
Dept: ONCOLOGY | Facility: CLINIC | Age: 68
End: 2023-02-21
Payer: MEDICARE

## 2023-02-21 VITALS
SYSTOLIC BLOOD PRESSURE: 133 MMHG | WEIGHT: 210.9 LBS | DIASTOLIC BLOOD PRESSURE: 66 MMHG | OXYGEN SATURATION: 98 % | BODY MASS INDEX: 33.03 KG/M2 | TEMPERATURE: 97.7 F | HEART RATE: 74 BPM

## 2023-02-21 DIAGNOSIS — Z86.718 HISTORY OF DVT OF LOWER EXTREMITY: Primary | ICD-10-CM

## 2023-02-21 DIAGNOSIS — R79.89 ELEVATED LFTS: ICD-10-CM

## 2023-02-21 DIAGNOSIS — D69.6 THROMBOCYTOPENIA: ICD-10-CM

## 2023-02-21 DIAGNOSIS — D72.819 LEUKOPENIA, UNSPECIFIED TYPE: ICD-10-CM

## 2023-02-21 DIAGNOSIS — R21 SKIN RASH: ICD-10-CM

## 2023-02-21 PROCEDURE — 99214 OFFICE O/P EST MOD 30 MIN: CPT | Performed by: INTERNAL MEDICINE

## 2023-02-21 PROCEDURE — G0463 HOSPITAL OUTPT CLINIC VISIT: HCPCS | Performed by: INTERNAL MEDICINE

## 2023-02-21 PROCEDURE — 1123F ACP DISCUSS/DSCN MKR DOCD: CPT | Performed by: INTERNAL MEDICINE

## 2023-02-21 PROCEDURE — 1125F AMNT PAIN NOTED PAIN PRSNT: CPT | Performed by: INTERNAL MEDICINE

## 2023-02-21 NOTE — PROGRESS NOTES
DATE OF VISIT: 2/21/2023      REASON FOR VISIT: Thrombocytopenia, leukopenia, recurrent DVT of right lower extremity, elevated liver function test      HISTORY OF PRESENT ILLNESS:   67-year-old male with medical problem consisting of hypertension, dyslipidemia, borderline diabetes mellitus, history of DVT x2 who was initially seen in consultation on February 2023 for evaluation of evaluation of leukopenia, thrombocytopenia, anemia and recurrent DVT.  Patient had extensive blood work done as well as ultrasound of abdomen.  Patient is here to discuss results and further recommendation.  Complains of tingling and numbness affecting right index finger since back surgery about 1 month ago.  Complains of numbness affecting mouth.  Complains of skin rash affecting left hand.  Denies any new lymph node enlargement.  Denies any drenching night sweats.  Denies any bleeding.              Past Medical History, Past Surgical History, Social History, Family History have been reviewed and are without significant changes except as mentioned.    Review of Systems   A comprehensive 14 point review of systems was performed and was negative except as mentioned in HPI.    Medications:  The current medication list was reviewed in the EMR    ALLERGIES:  No Known Allergies    Objective      Vitals:    02/21/23 0935   BP: 133/66   Pulse: 74   Temp: 97.7 °F (36.5 °C)   TempSrc: Temporal   SpO2: 98%   Weight: 95.7 kg (210 lb 14.4 oz)   PainSc:   4   PainLoc: Back     No flowsheet data found.    Physical Exam  Skin:     Comments: Erythematous scaly skin rash present on left hand   Neurological:      Mental Status: He is alert and oriented to person, place, and time.           RECENT LABS:  Glucose   Date Value Ref Range Status   02/08/2023 218 (H) 65 - 99 mg/dL Final     Sodium   Date Value Ref Range Status   02/08/2023 136 136 - 145 mmol/L Final   01/05/2023 141 136 - 145 mmol/L Final     Potassium   Date Value Ref Range Status   02/08/2023  4.1 3.5 - 5.2 mmol/L Final   01/05/2023 4.4 3.5 - 5.1 mmol/L Final     CO2   Date Value Ref Range Status   02/08/2023 23.0 22.0 - 29.0 mmol/L Final   07/13/2019 25 20 - 33 MMOL/L Final     Total CO2   Date Value Ref Range Status   01/05/2023 25 21 - 31 mmol/L Final     Chloride   Date Value Ref Range Status   02/08/2023 105 98 - 107 mmol/L Final   01/05/2023 106 98 - 107 mmol/L Final     Anion Gap   Date Value Ref Range Status   02/08/2023 8.0 5.0 - 15.0 mmol/L Final   01/05/2023 10 4 - 12 mmol/L Final     Creatinine   Date Value Ref Range Status   02/08/2023 0.91 0.76 - 1.27 mg/dL Final   01/05/2023 1.0 0.7 - 1.3 mg/dL Final     BUN   Date Value Ref Range Status   02/08/2023 14 8 - 23 mg/dL Final   01/05/2023 16 7 - 25 mg/dL Final     BUN/Creatinine Ratio   Date Value Ref Range Status   02/08/2023 15.4 7.0 - 25.0 Final     Calcium   Date Value Ref Range Status   02/08/2023 10.1 8.6 - 10.5 mg/dL Final   01/05/2023 9.7 8.6 - 10.3 mg/dL Final     Alkaline Phosphatase   Date Value Ref Range Status   02/08/2023 179 (H) 39 - 117 U/L Final     Total Protein   Date Value Ref Range Status   02/08/2023 7.1 6.0 - 8.5 g/dL Final     ALT (SGPT)   Date Value Ref Range Status   02/08/2023 55 (H) 1 - 41 U/L Final     AST (SGOT)   Date Value Ref Range Status   02/08/2023 58 (H) 1 - 40 U/L Final     Total Bilirubin   Date Value Ref Range Status   02/08/2023 0.6 0.0 - 1.2 mg/dL Final     Albumin   Date Value Ref Range Status   02/08/2023 4.1 3.5 - 5.2 g/dL Final     Globulin   Date Value Ref Range Status   02/08/2023 3.0 gm/dL Final     Lab Results   Component Value Date    WBC 4.07 02/08/2023    HGB 14.6 02/08/2023    HCT 40.4 02/08/2023    MCV 94.2 02/08/2023    PLT 99 (L) 02/08/2023     Lab Results   Component Value Date    NEUTROABS 2.27 02/08/2023     No results found for: , LABCA2, AFPTM, HCGQUANT, , CHROMGRNA, 8SMSH02KEM, CEA, REFLABREPO  Contains abnormal data Protein C & S, Functional  Order: [id number]   Status:  Final result      Visible to patient: No (not released)      Next appt: None      Dx: Thrombocytopenia (HCC); History of DV...     Specimen Information: Blood    0 Result Notes      Component  Ref Range & Units 13 d ago   Protein C-Functional  73 - 180 % 74    Comment: A deficiency of protein C (PC), either congenital or acquired,   increases the risk of thromboembolism. Acquired PC deficiency occurs   more frequently than congenital deficiency. PC levels can be   transiently diminished after a thrombotic event or surgery or in the   presence of certain anticoagulants. Heparin, direct Xa inhibitor, or   thrombotic inhibitor therapy does not alter PC levels physiologically   and does not interfere with this assay because it is chromogenic and   clot-based. Vitamin K antagonist therapy may decrease plasma levels   of functional protein C (PC) as PC is a vitamin K-dependent protein.   Vitamin K deficiency, due to dietary insufficiency or malabsorption   will also lead to reduced PC levels. Acquired deficiency can be   found in individuals with disseminated intravascular coagulation   (DIC) and sepsis. Severe hepatic disorders (hepatitis, cirrhosis,   etc.), nephrotic syndrome, malignancy and inflammatory bowel disease   can lead to diminished PC levels. Drug therapy with L-asparaginse or   fluorouracil can also reduce PC levels. Levels may be decreased in   patients with polycythemia vera, sickle cell disease and essential   thrombocythemia. Repeat evaluation on a new plasma sample to confirm   or refute this result should be considered, after ruling out acquired   causes, depending on the clinical scenario.   Protein S-Functional  63 - 140 % 54 Low     Comment: A deficiency of protein S (PS), either congenital or acquired,   increases the risk of thromboembolism. PS activity levels may be   falsely low in individuals with APCR/Factor V Leiden. Consider   performing free protein S antigen in those with APCR/Factor V  Dominic   before making a diagnosis of protein S deficiency. Acquired PS   deficiency is more common than congenital deficiency. PS values   decrease with normal pregnancy, and are also dependent on age, sex   and hormone status. PS values tend to be lower in a younger age group   and lower in women than in men. Levels may be decreased in   pre-menopausal women on oral contraceptive agents. Acquired deficiency   can occur as a result of vitamin K deficiency or antagonism, severe   hepatic disorders, (hepatitis, cirrhosis, etc.), nephrotic syndrome,   inflammatory bowel disease, certain chemotherapeutic agents,   L-asparaginse therapy, sepsis, disseminated intravascular coagulation   (DIC) and acute thrombosis. Levels may be decreased in patients with   polycythemia vera, sickle cell disease and essential thrombocythemia.   Repeat evaluation on a new plasma sample to confirm or refute this   result should be considered, after ruling out acquired causes,   depending on the clinical scenario.   Resulting Agency [facility]           Narrative  Performed by: [facility]  Performed at:  [address] [facility]  [id number]   : Jacque Urbano MD, Phone:  [phone]      Specimen Collected: 02/08/23 13:52 CST Last Resulted: 02/10/23 06:10 CST         Lab Flowsheet      Order Details      View Encounter      Lab and Collection Details      Routing      Result History     View Encounter Conversation           Result Care Coordination      Patient Communication     Release                  Contains abnormal data Beta-2 Glycoprotein Antibodies  Order: [id number]   Status: Final result      Visible to patient: No (not released)      Next appt: None      Dx: Thrombocytopenia (HCC); History of DV...     Specimen Information: Blood    0 Result Notes      Component  Ref Range & Units 13 d ago   Beta-2 Glyco 1 IgG  0 - 20 GPI IgG units <9    Comment: The reference interval reflects a 3SD or 99th  percentile interval,   which is thought to represent a potentially clinically significant   result in accordance with the International Consensus Statement on   the classification criteria for definitive antiphospholipid syndrome   (APS). J Thromb Haem 2006;4:295-306.   Beta-2 Glyco 1 IgA  0 - 25 GPI IgA units <9    Comment: The reference interval reflects a 3SD or 99th percentile interval,   which is thought to represent a potentially clinically significant   result in accordance with the International Consensus Statement on   the classification criteria for definitive antiphospholipid syndrome   (APS). J Thromb Haem 2006;4:295-306.   Beta-2 Glyco 1 IgM  0 - 32 GPI IgM units 47 High     Comment: The reference interval reflects a 3SD or 99th percentile interval,   which is thought to represent a potentially clinically significant   result in accordance with the International Consensus Statement on   the classification criteria for definitive antiphospholipid syndrome   (APS). J Thromb Haem 2006;4:295-306.   Resulting Agency [facility]           Narrative  Performed by: [facility]  Performed at:  [facility]  [id number]   : Jacque Urbano MD, Phone:  [phone]      Specimen Collected: 02/08/23 13:52 CST Last Resulted: 02/10/23 13:10 CST               Contains abnormal data Anticardiolipin Antibody, IgG / M, Qn  Order: [id number]   Status: Final result      Visible to patient: No (not released)      Next appt: None      Dx: Thrombocytopenia (HCC); History of DV...     Specimen Information: Blood    0 Result Notes      Component  Ref Range & Units 13 d ago   Anticardiolipin IgG  0 - 14 GPL U/mL 31 High     Comment:                           Negative:              <15                             Indeterminate:     15 - 20                             Low-Med Positive: >20 - 80                             High Positive:         >80   Anticardiolipin IgM  0 - 12 MPL U/mL  22 High     Comment:                           Negative:              <13                             Indeterminate:     13 - 20                             Low-Med Positive: >20 - 80                             High Positive:         >80   Resulting Agency [facility]           Narrative  Performed by: [facility]  Performed at:  [facility] - [facility] [address]  [id number]   : Christophe Franklin PhD, Phone:  [phone]      Specimen Collected: 02/08/23 13:52 CST Last Resulted: 02/09/23 15                       PATHOLOGY:  * Cannot find OR log *         RADIOLOGY DATA :  US Abdomen Complete    Result Date: 2/15/2023  CONCLUSION: Coarsened echotexture of the liver compatible with hepatocellular disease. Increased cortical echogenicity kidneys compatible with medical renal disease. Splenomegaly with the spleen measuring 15 cm x 13 cm x 6 cm. [id number] Electronically signed by:  Leonard Rose MD  2/15/2023 11:17 PM CST Workstation: [id number]          Assessment & Plan     1.  Thrombocytopenia:  - Patient has been having ongoing thrombocytopenia since November 2022  - Recent platelet count is 99,000.  - Immature platelet fraction was normal at 3.5.  ITP is less likely  - Currently on folic acid p.o. daily  - Liver enzymes are elevated and ultrasound of liver does show changes suggestive of fatty liver plus or minus cirrhosis with splenomegaly that might be contributing to thrombocytopenia  - For now we will continue with clinical monitoring  - We will have patient return to clinic in 3 months with repeat CBC, CMP, B12, folate, anticardiolipin antibody, beta-2 glycoprotein antibodies to be done 1 week prior to next clinic visit.    2.  Leukopenia:  - Patient has been having intermittent leukopenia which could be related to splenomegaly  - Recent white blood cell count and platelet count is normal    3.  Recurrent DVT of right lower extremity  - Patient was initially diagnosed with DVT involving right lower  extremity in 1990s for which patient received anticoagulation for few weeks.  Despite her episode of DVT was provoked by prolonged car ride.  - Patient had a recurrence of DVT involving right lower extremity in December 2016 for which patient received Xarelto for few months until resolution of DVT  - Patient's mother also has diagnosis of DVT recently.  - Hypercoagulable work-up done shows positivity for IgM beta-2 glycoprotein antibody.  Anticardiolipin IgM as well as IgG antibodies indeterminate.  - Protein S level is low consistent with protein S deficiency.  - Due to 2 episode of DVT as well as protein S deficiency recommend restarting anticoagulation.  - Patient is agreeable.  - Prescription for Xarelto 20 mg p.o. daily has been sent to his pharmacy today    4.  Elevated liver function test:  - Likely secondary to fatty liver plus or minus developing cirrhosis.  - Gastroenterology referral has been placed today for further evaluation of elevated liver function test    5.  Skin rash:  - Patient is complaining of enlarging skin rash affecting left hand.  Recommend dermatology evaluation.  Dermatology referral has been placed today    6.  Hypertension    7.  Diabetes mellitus    8.  Health maintenance: Patient does not smoke.  Had a colonoscopy in 2018    9.  Advance care planning:  - CODE STATUS and resuscitation was discussed with patient today.  Patient wants to be full code.                 PHQ-9 Total Score: 0   -Patient is not homicidal or suicidal.  No acute intervention required.    Herbert Cowart reports a pain score of 4.  Given his pain assessment as noted, treatment options were discussed and the following options were decided upon as a follow-up plan to address the patient's pain: continuation of current treatment plan for pain.         David Estevez MD  2/21/2023  18:51 CST        Part of this note may be an electronic transcription/translation of spoken language to printed text using the Dragon  Dictation System.          CC:

## 2023-02-23 NOTE — TELEPHONE ENCOUNTER
Rx Refill Note  Requested Prescriptions     Pending Prescriptions Disp Refills   • rivaroxaban (XARELTO) 20 MG tablet 30 tablet 3     Sig: Take 1 tablet by mouth Daily.      Last office visit with prescribing clinician: 2/21/2023   Last telemedicine visit with prescribing clinician: 5/23/2023   Next office visit with prescribing clinician: 5/23/2023                         Would you like a call back once the refill request has been completed: [] Yes [] No    If the office needs to give you a call back, can they leave a voicemail: [] Yes [] No    Vanessa Lloyd RN  02/23/23, 13:38 CST

## 2023-03-13 ENCOUNTER — OFFICE VISIT (OUTPATIENT)
Dept: GASTROENTEROLOGY | Facility: CLINIC | Age: 68
End: 2023-03-13
Payer: MEDICARE

## 2023-03-13 ENCOUNTER — LAB (OUTPATIENT)
Dept: LAB | Facility: HOSPITAL | Age: 68
End: 2023-03-13
Payer: MEDICARE

## 2023-03-13 VITALS
HEIGHT: 67 IN | DIASTOLIC BLOOD PRESSURE: 84 MMHG | BODY MASS INDEX: 32.55 KG/M2 | HEART RATE: 92 BPM | SYSTOLIC BLOOD PRESSURE: 160 MMHG | WEIGHT: 207.4 LBS

## 2023-03-13 DIAGNOSIS — R74.8 ABNORMAL LEVELS OF OTHER SERUM ENZYMES: ICD-10-CM

## 2023-03-13 DIAGNOSIS — R79.89 ELEVATED LIVER FUNCTION TESTS: ICD-10-CM

## 2023-03-13 DIAGNOSIS — R93.2 ABNORMAL FINDINGS ON DIAGNOSTIC IMAGING OF LIVER AND BILIARY TRACT: ICD-10-CM

## 2023-03-13 DIAGNOSIS — D69.6 THROMBOCYTOPENIA: ICD-10-CM

## 2023-03-13 DIAGNOSIS — R79.89 ELEVATED LIVER FUNCTION TESTS: Primary | ICD-10-CM

## 2023-03-13 DIAGNOSIS — Z13.6 ENCOUNTER FOR SCREENING FOR CARDIOVASCULAR DISORDERS: ICD-10-CM

## 2023-03-13 LAB
ALBUMIN SERPL-MCNC: 4.1 G/DL (ref 3.5–5.2)
ALBUMIN/GLOB SERPL: 1.4 G/DL
ALP SERPL-CCNC: 180 U/L (ref 39–117)
ALPHA-FETOPROTEIN: 5.87 NG/ML (ref 0–8.3)
ALPHA1 GLOB MFR UR ELPH: 173 MG/DL (ref 90–200)
ALT SERPL W P-5'-P-CCNC: 29 U/L (ref 1–41)
ANION GAP SERPL CALCULATED.3IONS-SCNC: 16.9 MMOL/L (ref 5–15)
AST SERPL-CCNC: 52 U/L (ref 1–40)
BASOPHILS # BLD AUTO: 0.02 10*3/MM3 (ref 0–0.2)
BASOPHILS NFR BLD AUTO: 0.5 % (ref 0–1.5)
BILIRUB SERPL-MCNC: 0.5 MG/DL (ref 0–1.2)
BUN SERPL-MCNC: 11 MG/DL (ref 8–23)
BUN/CREAT SERPL: 11.7 (ref 7–25)
CALCIUM SPEC-SCNC: 9.9 MG/DL (ref 8.6–10.5)
CERULOPLASMIN SERPL-MCNC: 22 MG/DL (ref 16–31)
CHLORIDE SERPL-SCNC: 103 MMOL/L (ref 98–107)
CO2 SERPL-SCNC: 20.1 MMOL/L (ref 22–29)
CREAT SERPL-MCNC: 0.94 MG/DL (ref 0.76–1.27)
DEPRECATED RDW RBC AUTO: 44.9 FL (ref 37–54)
EGFRCR SERPLBLD CKD-EPI 2021: 88.9 ML/MIN/1.73
EOSINOPHIL # BLD AUTO: 0.08 10*3/MM3 (ref 0–0.4)
EOSINOPHIL NFR BLD AUTO: 2 % (ref 0.3–6.2)
ERYTHROCYTE [DISTWIDTH] IN BLOOD BY AUTOMATED COUNT: 12.6 % (ref 12.3–15.4)
GLOBULIN UR ELPH-MCNC: 2.9 GM/DL
GLUCOSE SERPL-MCNC: 109 MG/DL (ref 65–99)
HCT VFR BLD AUTO: 39.2 % (ref 37.5–51)
HGB BLD-MCNC: 14.3 G/DL (ref 13–17.7)
IMM GRANULOCYTES # BLD AUTO: 0.01 10*3/MM3 (ref 0–0.05)
IMM GRANULOCYTES NFR BLD AUTO: 0.3 % (ref 0–0.5)
INR PPP: 3.19 (ref 0.8–1.2)
IRON 24H UR-MRATE: 86 MCG/DL (ref 59–158)
IRON SATN MFR SERPL: 26 % (ref 20–50)
LYMPHOCYTES # BLD AUTO: 1.31 10*3/MM3 (ref 0.7–3.1)
LYMPHOCYTES NFR BLD AUTO: 33.4 % (ref 19.6–45.3)
MCH RBC QN AUTO: 35.9 PG (ref 26.6–33)
MCHC RBC AUTO-ENTMCNC: 36.5 G/DL (ref 31.5–35.7)
MCV RBC AUTO: 98.5 FL (ref 79–97)
MONOCYTES # BLD AUTO: 0.32 10*3/MM3 (ref 0.1–0.9)
MONOCYTES NFR BLD AUTO: 8.2 % (ref 5–12)
NEUTROPHILS NFR BLD AUTO: 2.18 10*3/MM3 (ref 1.7–7)
NEUTROPHILS NFR BLD AUTO: 55.6 % (ref 42.7–76)
NRBC BLD AUTO-RTO: 0 /100 WBC (ref 0–0.2)
PLATELET # BLD AUTO: 94 10*3/MM3 (ref 140–450)
PMV BLD AUTO: 11 FL (ref 6–12)
POTASSIUM SERPL-SCNC: 4 MMOL/L (ref 3.5–5.2)
PROT SERPL-MCNC: 7 G/DL (ref 6–8.5)
PROTHROMBIN TIME: 32.9 SECONDS (ref 11.1–15.3)
RBC # BLD AUTO: 3.98 10*6/MM3 (ref 4.14–5.8)
SODIUM SERPL-SCNC: 140 MMOL/L (ref 136–145)
TIBC SERPL-MCNC: 334 MCG/DL (ref 298–536)
TRANSFERRIN SERPL-MCNC: 224 MG/DL (ref 200–360)
WBC NRBC COR # BLD: 3.92 10*3/MM3 (ref 3.4–10.8)

## 2023-03-13 PROCEDURE — 82103 ALPHA-1-ANTITRYPSIN TOTAL: CPT

## 2023-03-13 PROCEDURE — 80053 COMPREHEN METABOLIC PANEL: CPT | Performed by: NURSE PRACTITIONER

## 2023-03-13 PROCEDURE — 82390 ASSAY OF CERULOPLASMIN: CPT

## 2023-03-13 PROCEDURE — 86381 MITOCHONDRIAL ANTIBODY EACH: CPT

## 2023-03-13 PROCEDURE — 84478 ASSAY OF TRIGLYCERIDES: CPT | Performed by: NURSE PRACTITIONER

## 2023-03-13 PROCEDURE — 86038 ANTINUCLEAR ANTIBODIES: CPT

## 2023-03-13 PROCEDURE — 36415 COLL VENOUS BLD VENIPUNCTURE: CPT

## 2023-03-13 PROCEDURE — 85025 COMPLETE CBC W/AUTO DIFF WBC: CPT

## 2023-03-13 PROCEDURE — 84466 ASSAY OF TRANSFERRIN: CPT | Performed by: NURSE PRACTITIONER

## 2023-03-13 PROCEDURE — 83883 ASSAY NEPHELOMETRY NOT SPEC: CPT | Performed by: NURSE PRACTITIONER

## 2023-03-13 PROCEDURE — 83010 ASSAY OF HAPTOGLOBIN QUANT: CPT | Performed by: NURSE PRACTITIONER

## 2023-03-13 PROCEDURE — 82465 ASSAY BLD/SERUM CHOLESTEROL: CPT | Performed by: NURSE PRACTITIONER

## 2023-03-13 PROCEDURE — 82172 ASSAY OF APOLIPOPROTEIN: CPT | Performed by: NURSE PRACTITIONER

## 2023-03-13 PROCEDURE — 86015 ACTIN ANTIBODY EACH: CPT

## 2023-03-13 PROCEDURE — 85610 PROTHROMBIN TIME: CPT

## 2023-03-13 PROCEDURE — 83540 ASSAY OF IRON: CPT | Performed by: NURSE PRACTITIONER

## 2023-03-13 PROCEDURE — 82977 ASSAY OF GGT: CPT | Performed by: NURSE PRACTITIONER

## 2023-03-13 PROCEDURE — 99203 OFFICE O/P NEW LOW 30 MIN: CPT | Performed by: NURSE PRACTITIONER

## 2023-03-13 PROCEDURE — 82105 ALPHA-FETOPROTEIN SERUM: CPT

## 2023-03-13 NOTE — PROGRESS NOTES
Chief Complaint   Patient presents with   • Elevated Hepatic Enzymes       Subjective    Herebrt Cowart is a 67 y.o. male. he is here today for follow-up.                                                                  Assessment & Plan                                     1. Elevated liver function tests    2. Thrombocytopenia (HCC)    3. Abnormal findings on diagnostic imaging of liver and biliary tract    4. Abnormal levels of other serum enzymes    5. Encounter for screening for cardiovascular disorders      Plan; will order lab work to rule out autoimmune hepatitis, overload syndrome or other source of elevated liver enzymes.  Discussed fatty liver and will also obtain Kramer FibroSure.  Follow-up in 1 month for recheck return office sooner if needed    Follow-up: Return in about 4 weeks (around 4/10/2023) for Recheck, After test.     HPI  67-year-old male with concurrent medical history of hypertension, dyslipidemia, diabetes mellitus, DVT x2, elevated liver enzymes and splenomegaly presents to discuss fatty liver and elevated liver enzymes.  He denies any history of alcoholism, drug abuse or personal history of hepatitis.  He was seen by hematologist due to thrombocytopenia and leukopenia.  Liver enzymes were found to be elevated follow-up ultrasound noted fatty liver with possible developing cirrhosis.  He denies any significant abdominal pain reports chronic back pain.  Denies any change in bowel movements or blood in stool.  Had normal colonoscopy per Dr. Pereira September 2018 with repeat recommended September 2023 for surveillance.  He does take chronic NSAIDs for pain      Review of Systems  Review of Systems   Constitutional: Negative for activity change, appetite change, chills, diaphoresis, fatigue, fever and unexpected weight change.   HENT: Negative for trouble swallowing.    Respiratory:  "Negative for cough and shortness of breath.    Gastrointestinal: Negative for abdominal distention, abdominal pain, anal bleeding, blood in stool, constipation, diarrhea, nausea, rectal pain and vomiting.       /84 (BP Location: Left arm)   Pulse 92   Ht 170.2 cm (67\")   Wt 94.1 kg (207 lb 6.4 oz)   BMI 32.48 kg/m²     Objective      Physical Exam  Constitutional:       General: He is not in acute distress.     Appearance: Normal appearance. He is normal weight. He is not ill-appearing.   HENT:      Head: Normocephalic and atraumatic.   Pulmonary:      Effort: Pulmonary effort is normal.   Abdominal:      General: Abdomen is flat. Bowel sounds are normal. There is no distension.      Palpations: Abdomen is soft. There is no mass.      Tenderness: There is no abdominal tenderness.   Neurological:      Mental Status: He is alert.               The following portions of the patient's history were reviewed and updated as appropriate:   Past Medical History:   Diagnosis Date   • Cancer (HCC)     skin cancer   • Closed nondisplaced fracture of fifth metatarsal bone of right foot with routine healing    • Clotting disorder (HCC)    • Colon polyp 9/18/2018   • Diabetes mellitus (HCC)    • Fatty liver 02/2023   • GERD (gastroesophageal reflux disease)    • Hyperlipidemia    • Hypertension    • Plantar fasciitis      Past Surgical History:   Procedure Laterality Date   • COLONOSCOPY N/A 09/18/2018    Procedure: COLONOSCOPY;  Surgeon: Judson Pereira DO;  Location: [facility] ENDOSCOPY;  Service: Gastroenterology   • UPPER GASTROINTESTINAL ENDOSCOPY  1994   • VOCAL CORD BIOPSY     • WRIST SURGERY       Family History   Problem Relation Age of Onset   • Cancer Mother    • Osteoporosis Mother    • Psoriasis Mother    • Heart disease Father    • Diabetes Father    • Colon cancer Father    • Colon polyps Father    • Cancer Paternal Grandfather    • Diabetes Paternal Grandfather        No Known Allergies  Social History "     Socioeconomic History   • Marital status:    Tobacco Use   • Smoking status: Never   Substance and Sexual Activity   • Alcohol use: No   • Drug use: No   • Sexual activity: Not Currently     Current Medications:  Prior to Admission medications    Medication Sig Start Date End Date Taking? Authorizing Provider   aspirin 81 MG EC tablet Daily.   Yes ProviderEran MD   ATENOLOL PO Take 25 mg by mouth Daily.   Yes ProviderEran MD   diclofenac sodium (VOTAREN XR) 100 MG 24 hr tablet diclofenac  mg tablet,extended release 24 hr   TAKE 1 TABLET BY MOUTH ONCE DAILY WITH MEALS   Yes Provider, MD Eran   esomeprazole (nexIUM) 20 MG capsule Take 1 capsule by mouth.   Yes ProviderEran MD   folic acid (FOLVITE) 1 MG tablet Take 1 tablet by mouth Daily. 2/8/23  Yes David Estevez MD   glipizide (GLUCOTROL) 5 MG tablet Take 1 tablet by mouth Daily. 2/3/23  Yes ProviderEran MD   HYDROcodone-acetaminophen (NORCO) 7.5-325 MG per tablet  1/25/23  Yes Provider, MD Eran   hydrOXYzine pamoate (VISTARIL) 50 MG capsule  1/24/23  Yes Provider, MD Eran   losartan (COZAAR) 50 MG tablet Take 1 tablet by mouth Daily.   Yes Provider, MD Eran   metFORMIN (GLUCOPHAGE) 500 MG tablet Take 1 tablet by mouth Daily With Breakfast.   Yes Provider, MD Eran   methylPREDNISolone (MEDROL) 4 MG dose pack  1/25/23  Yes ProviderEran MD   naproxen (NAPROSYN) 500 MG tablet Every 12 (Twelve) Hours.   Yes Provider, MD Eran   ondansetron ODT (ZOFRAN-ODT) 4 MG disintegrating tablet Place 1 tablet on the tongue Every 6 (Six) Hours As Needed for Nausea or Vomiting. 10/12/20  Yes Bassam Victor DO   rivaroxaban (XARELTO) 20 MG tablet Take 1 tablet by mouth Daily. 2/23/23  Yes David Estevez MD   Rosuvastatin Calcium (CRESTOR PO) Take 10 mg by mouth Daily.   Yes ProviderEran MD   tiZANidine (ZANAFLEX) 4 MG tablet Take 1 tablet by mouth Every Night.   Yes  ProviderEran MD   tobramycin 0.3 % solution ophthalmic solution tobramycin 0.3 % eye drops   INSTILL 2 DROPS INTO RIGHT EYE EVERY 6 HOURS FOR 7 DAYS   Yes Eran العراقي MD   venlafaxine (EFFEXOR) 50 MG tablet Take 1 tablet by mouth Daily.   Yes Eran العراقي MD     Orders placed during this encounter include:  Orders Placed This Encounter   Procedures   • AFP tumor marker     Standing Status:   Future     Standing Expiration Date:   3/13/2024     Order Specific Question:   Release to patient     Answer:   Routine Release   • Alpha-1-antitrypsin     Standing Status:   Future     Standing Expiration Date:   3/13/2024     Order Specific Question:   Release to patient     Answer:   Routine Release   • TIM     Standing Status:   Future     Standing Expiration Date:   3/13/2024     Order Specific Question:   Release to patient     Answer:   Routine Release   • Anti-smooth muscle antibody titer     Standing Status:   Future     Standing Expiration Date:   3/13/2024     Order Specific Question:   Release to patient     Answer:   Routine Release   • Ceruloplasmin     Standing Status:   Future     Standing Expiration Date:   3/13/2024     Order Specific Question:   Release to patient     Answer:   Routine Release   • Comprehensive metabolic panel     Standing Status:   Future     Standing Expiration Date:   3/13/2024     Order Specific Question:   Release to patient     Answer:   Routine Release   • Gamma GT     Standing Status:   Future     Standing Expiration Date:   3/13/2024     Order Specific Question:   Release to patient     Answer:   Routine Release   • Mitochondrial antibodies, M2     Standing Status:   Future     Standing Expiration Date:   3/13/2024     Order Specific Question:   Release to patient     Answer:   Routine Release   • Protime-INR     Standing Status:   Future     Standing Expiration Date:   3/13/2024     Order Specific Question:   Release to patient     Answer:   Routine  Release   • Iron Profile   • DYE Fibrosure     Order Specific Question:   Release to patient     Answer:   Routine Release   • CBC and differential     Standing Status:   Future     Standing Expiration Date:   3/13/2024     Order Specific Question:   Manual Differential     Answer:   No     Order Specific Question:   Release to patient     Answer:   Routine Release     * Surgery not found *  No orders of the defined types were placed in this encounter.        Review and/or summary of lab tests, radiology, procedures, medications. Review and summary of old records and obtaining of history. The risks and benefits of my recommendations, as well as other treatment options were discussed . Any questions/concerned were answered. Patient voiced understanding and agreement.          This document has been electronically signed by ESTELLE Marlow on March 13, 2023 14:02 CDT                                               Results for orders placed or performed in visit on 02/08/23   Immature Platelet Fraction    Specimen: Blood   Result Value Ref Range    IPF 3.50 0.90 - 6.50 %   Reflexed PTT-LA Mix    Specimen: Blood   Result Value Ref Range    PTT LA MIX 40.5 0.0 - 40.5 sec   PTT-LA Incub Mix    Specimen: Blood   Result Value Ref Range    Incubated PTT LA Mix 41.9 (H) 0.0 - 40.5 sec   Protein C & S Antigens    Specimen: Blood   Result Value Ref Range    Protein C Antigen 63 60 - 150 %    Protein S Ag, Total 112 60 - 150 %    Protein S Ag, Free 97 61 - 136 %   Protein C & S, Functional    Specimen: Blood   Result Value Ref Range    Protein C-Functional 74 73 - 180 %    Protein S-Functional 54 (L) 63 - 140 %   ~Hexagonal Phase Phospholipid    Specimen: Blood   Result Value Ref Range    Hexagonal Phase Phospholipid 8 0 - 11 sec   Anticardiolipin Antibody, IgG / M, Qn    Specimen: Blood   Result Value Ref Range    Anticardiolipin IgG 31 (H) 0 - 14 GPL U/mL    Anticardiolipin IgM 22 (H) 0 - 12 MPL U/mL   CBC Auto Differential     Specimen: Blood   Result Value Ref Range    WBC 4.07 3.40 - 10.80 10*3/mm3    RBC 4.29 4.14 - 5.80 10*6/mm3    Hemoglobin 14.6 13.0 - 17.7 g/dL    Hematocrit 40.4 37.5 - 51.0 %    MCV 94.2 79.0 - 97.0 fL    MCH 34.0 (H) 26.6 - 33.0 pg    MCHC 36.1 (H) 31.5 - 35.7 g/dL    RDW 11.7 (L) 12.3 - 15.4 %    RDW-SD 40.2 37.0 - 54.0 fl    MPV 10.3 6.0 - 12.0 fL    Platelets 99 (L) 140 - 450 10*3/mm3    Neutrophil % 55.7 42.7 - 76.0 %    Lymphocyte % 34.2 19.6 - 45.3 %    Monocyte % 7.6 5.0 - 12.0 %    Eosinophil % 2.0 0.3 - 6.2 %    Basophil % 0.5 0.0 - 1.5 %    Immature Grans % 0.0 0.0 - 0.5 %    Neutrophils, Absolute 2.27 1.70 - 7.00 10*3/mm3    Lymphocytes, Absolute 1.39 0.70 - 3.10 10*3/mm3    Monocytes, Absolute 0.31 0.10 - 0.90 10*3/mm3    Eosinophils, Absolute 0.08 0.00 - 0.40 10*3/mm3    Basophils, Absolute 0.02 0.00 - 0.20 10*3/mm3    Immature Grans, Absolute 0.00 0.00 - 0.05 10*3/mm3    nRBC 0.0 0.0 - 0.2 /100 WBC   Antithrombin Panel    Specimen: Blood   Result Value Ref Range    AntiThromb III Func 96 75 - 135 %    Antithrombin Antigen 88 72 - 124 %   Factor II, DNA Analysis    Specimen: Blood   Result Value Ref Range    Factor II, DNA Analysis Normal Normal   Cardiolipin Antibody, IgA    Specimen: Blood   Result Value Ref Range    Anticardiolipin IgA <9 0 - 11 APL U/mL   Hepatitis B Core Antibody, Total    Specimen: Blood   Result Value Ref Range    Hep B Core Total Ab Negative Negative   Beta-2 Glycoprotein Antibodies    Specimen: Blood   Result Value Ref Range    Beta-2 Glyco 1 IgG <9 0 - 20 GPI IgG units    Beta-2 Glyco 1 IgA <9 0 - 25 GPI IgA units    Beta-2 Glyco 1 IgM 47 (H) 0 - 32 GPI IgM units   Lupus Anticoagulant    Specimen: Blood   Result Value Ref Range    Dilute Prothrombin Time(dPT) 41.9 0.0 - 47.6 sec    dPT Confirm Ratio 1.06 0.00 - 1.34 Ratio    Thrombin Time 16.7 0.0 - 23.0 sec    PTT Lupus Anticoagulant 46.3 (H) 0.0 - 43.5 sec    Dilute Viper Venom Time 42.4 0.0 - 47.0 sec    Lupus  Anticoagulant Reflex Comment:    Hepatitis B Surface Antibody    Specimen: Blood   Result Value Ref Range    Hep B S Ab Non-Reactive Non-Reactive   Hepatitis B Surface Antigen    Specimen: Blood   Result Value Ref Range    Hepatitis B Surface Ag Non-Reactive Non-Reactive   Factor 5 Leiden    Specimen: Blood   Result Value Ref Range    Factor V Leiden Normal Normal   Homocysteine    Specimen: Blood   Result Value Ref Range    Homocysteine, Plasma (Quant) 12.2 0.0 - 15.0 umol/L   Comprehensive Metabolic Panel    Specimen: Blood   Result Value Ref Range    Glucose 218 (H) 65 - 99 mg/dL    BUN 14 8 - 23 mg/dL    Creatinine 0.91 0.76 - 1.27 mg/dL    Sodium 136 136 - 145 mmol/L    Potassium 4.1 3.5 - 5.2 mmol/L    Chloride 105 98 - 107 mmol/L    CO2 23.0 22.0 - 29.0 mmol/L    Calcium 10.1 8.6 - 10.5 mg/dL    Total Protein 7.1 6.0 - 8.5 g/dL    Albumin 4.1 3.5 - 5.2 g/dL    ALT (SGPT) 55 (H) 1 - 41 U/L    AST (SGOT) 58 (H) 1 - 40 U/L    Alkaline Phosphatase 179 (H) 39 - 117 U/L    Total Bilirubin 0.6 0.0 - 1.2 mg/dL    Globulin 3.0 gm/dL    A/G Ratio 1.4 g/dL    BUN/Creatinine Ratio 15.4 7.0 - 25.0    Anion Gap 8.0 5.0 - 15.0 mmol/L    eGFR 92.4 >60.0 mL/min/1.73   Results for orders placed or performed during the hospital encounter of 10/11/20   COVID-19, [facility] IN-HOUSE, NP SWAB IN TRANSPORT MEDIA 8-10 HR TAT - Swab, Nasopharynx    Specimen: Nasopharynx; Swab   Result Value Ref Range    COVID19 Not Detected Not Detected - Ref. Range   Results for orders placed or performed during the hospital encounter of 09/18/18   Tissue Pathology Exam    Specimen: A: Large Intestine, Sigmoid Colon; Polyp    B: Large Intestine, Transverse Colon; Polyp    C: Large Intestine, Left / Descending Colon; Polyp   Result Value Ref Range    Case Report       Surgical Pathology Report                         Case: [id number]                                  Authorizing Provider:  Judson Pereira DO      Collected:           09/18/2018 11:22  AM          Ordering Location:     [facility]             Received:            09/18/2018 01:14 PM                                 [facility]                                                     Pathologist:           Homar Sung MD                                                         Specimens:   1) - Large Intestine, Sigmoid Colon, polyp                                                          2) - Large Intestine, Transverse Colon, polyp                                                       3) - Large Intestine, Left / Descending Colon, polyps                                      Final Diagnosis       1.  POLYP, SIGMOID COLON:   TUBULAR ADENOMA.     2.  POLYP, TRANSVERSE COLON:   TUBULAR ADENOMA.     3.  POLYPS, DESCENDING COLON:   NO SIGNIFICANT HISTOLOGIC ABNORMALITY.       Gross Description       Received for examination are 3 containers.  The first container (1) has an ovoid brown polyp measuring 0.7 x 0.8 x 0.6 cm.  The second and third containers (2, 3) have nodular bits of white soft tissue measuring 0.4-0.6 cc in aggregate.  All specimens are embedded as labeled:  1A polyp, sigmoid colon; 2A polyp, transverse colon; 3A polyps, descending colon.      POC Glucose Once    Specimen: Blood   Result Value Ref Range    Glucose 126 70 - 130 mg/dL   Results for orders placed or performed in visit on 04/29/10   Converted Surgical Pathology    Specimen: Tissue   Result Value Ref Range    Spec Descr 1 SPECIMEN(S): A ANTRAL BIOPSY     Clinical Information   CLINICAL HISTORY:  None Given       Clinical Diagnosis   CLINICAL DIAGNOSIS:  Gastritis       Gross Description         GROSS DESCRIPTION:  The specimen consists of a mucosal biopsy measuring up to 0.3 cm in  greatest dimension.  All embedded.      Final Diagnosis         FINAL DIAGNOSIS:  GASTRIC ANTRUM, MUCOSAL BIOPSY:       MILD CHRONIC INFLAMMATION.       NO EVIDENCE OF HELICOBACTER PYLORI.    DIAGNOSIS CODE:  4      CONVERTED  (HISTORICAL) FINAL PATHOLOGIST       Diagnostician:  DEVI ROBERTS M.D.  Pathologist  Electronically Signed 04/30/2010

## 2023-03-14 LAB
ANA SER QL: NEGATIVE
MITOCHONDRIA M2 IGG SER-ACNC: <20 UNITS (ref 0–20)
SMA IGG SER-ACNC: 12 UNITS (ref 0–19)

## 2023-03-16 ENCOUNTER — TELEPHONE (OUTPATIENT)
Dept: ONCOLOGY | Facility: CLINIC | Age: 68
End: 2023-03-16

## 2023-03-16 LAB
A2 MACROGLOB SERPL-MCNC: 372 MG/DL (ref 110–276)
ALT SERPL W P-5'-P-CCNC: 28 IU/L (ref 0–55)
APO A-I SERPL-MCNC: 127 MG/DL (ref 101–178)
AST SERPL W P-5'-P-CCNC: 43 IU/L (ref 0–40)
BILIRUB SERPL-MCNC: 0.4 MG/DL (ref 0–1.2)
CHOLEST SERPL-MCNC: 188 MG/DL (ref 100–199)
FIBROSIS SCORING:: ABNORMAL
FIBROSIS STAGE SERPL QL: ABNORMAL
GGT SERPL-CCNC: 101 IU/L (ref 0–65)
GLUCOSE SERPL-MCNC: 107 MG/DL (ref 70–99)
HAPTOGLOB SERPL-MCNC: 25 MG/DL (ref 32–363)
INTERPRETATIONS: (REFERENCE): ABNORMAL
LABORATORY COMMENT REPORT: ABNORMAL
LIVER FIBR SCORE SERPL CALC.FIBROSURE: 0.88 (ref 0–0.21)
NASH SCORING (REFERENCE): ABNORMAL
NECROINFLAMMATORY ACT GRADE SERPL QL: ABNORMAL
NECROINFLAMMATORY ACT SCORE SERPL: 0.75
SERVICE CMNT-IMP: ABNORMAL
STEATOSIS GRADE (REFERENCE): ABNORMAL
STEATOSIS GRADING (REFERENCE): ABNORMAL
STEATOSIS SCORE (REFERENCE): 0.75 (ref 0–0.3)
TRIGL SERPL-MCNC: 555 MG/DL (ref 0–149)

## 2023-03-16 NOTE — PROGRESS NOTES
Patient called our office today as he had a work-up done by gastroenterology team regarding elevated liver function test.  Based on work-up patient was found to have coagulopathy with elevated INR of 3.19.  Patient was also found to have fibrosis based on DYE fibrosure.  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  Since starting Xarelto 20 mg p.o. daily patient complains of self-limiting nosebleed.  Denies any other bleeding.  Had a lengthy discussion with patient today over the phone regarding anticoagulation.  Discussed with patient with his history of recurrent DVT as well as low protein S level and positivity of beta-2 glycoprotein antibody he will be high risk of recurrent DVT and pulmonary embolism without anticoagulation.  Patient is also considered to be high risk for bleeding due to his thrombocytopenia from cirrhosis of liver and severe  steatosis of liver.  Patient has not had any major bleeding since starting Xarelto.  At this point after lengthy discussion patient wants to continue with Xarelto.  Discussed with patient if he has any major bleeding he needs to come to the emergency room.  If he has any major bleeding we will have to discontinue anticoagulation in that case.  Patient states he is scheduled for spine surgery.  He does not have any surgery date as of now.  He has been instructed by his spine surgeon to hold his Xarelto for 7 days prior to surgery.  Patient was also encouraged to call our office once the surgery date has finalized.  Plan is to recheck his PT INR 1 to 2 days prior to his surgery to make sure it is not significantly elevated from his underlying cirrhosis.  Patient voiced understanding and will call us with surgery date once available.

## 2023-04-13 ENCOUNTER — OFFICE VISIT (OUTPATIENT)
Dept: GASTROENTEROLOGY | Facility: CLINIC | Age: 68
End: 2023-04-13
Payer: MEDICARE

## 2023-04-13 VITALS
BODY MASS INDEX: 32.58 KG/M2 | WEIGHT: 207.6 LBS | SYSTOLIC BLOOD PRESSURE: 122 MMHG | HEIGHT: 67 IN | HEART RATE: 89 BPM | DIASTOLIC BLOOD PRESSURE: 62 MMHG

## 2023-04-13 DIAGNOSIS — K76.0 NAFLD (NONALCOHOLIC FATTY LIVER DISEASE): Primary | ICD-10-CM

## 2023-04-13 DIAGNOSIS — Z80.0 FH: COLON CANCER: ICD-10-CM

## 2023-04-13 DIAGNOSIS — Z86.010 PERSONAL HISTORY OF COLONIC POLYPS: ICD-10-CM

## 2023-04-13 DIAGNOSIS — I10 ESSENTIAL (PRIMARY) HYPERTENSION: ICD-10-CM

## 2023-04-13 PROCEDURE — 1159F MED LIST DOCD IN RCRD: CPT | Performed by: NURSE PRACTITIONER

## 2023-04-13 PROCEDURE — 99213 OFFICE O/P EST LOW 20 MIN: CPT | Performed by: NURSE PRACTITIONER

## 2023-04-13 PROCEDURE — 1160F RVW MEDS BY RX/DR IN RCRD: CPT | Performed by: NURSE PRACTITIONER

## 2023-04-13 NOTE — PROGRESS NOTES
Chief Complaint   Patient presents with   • Elevated Hepatic Enzymes       Subjective    Herbert Cowart is a 67 y.o. male. he is here today for follow-up.                                                                  Assessment & Plan                                     1. NAFLD (nonalcoholic fatty liver disease)      Recommend patient continue with diet lifestyle modifications.  Recommend prior to upcoming surgery to recheck INR prior to procedure.      We discussed the natural course of non-alcoholic fatty liver disease (NAFLD), its consequences (e.g., cirrhosis), and its treatment with special emphasis on weight loss (more than 10% over the next 6 months) with an ultimate goal to optimize BMI via dietary changes and regular exercise.  There is no FDA-approved pharmacologic therapy for NAFLD.  The favorable impact of coffee (~24 oz a day) on hepatic fibrosis was discussed    I counseled the patient about the importance of managing other manifestations of metabolic syndrome. I made him aware that cardiovascular events and malignancy are common causes of death in NAFLD patients. Therefore, preventive health measures are important in this regard.  In addition will obtain laboratory test to assess overall function of liver for surveillance.      Follow-up: Return in about 3 months (around 7/13/2023) for Recheck, After test.     HPI  67-year-old male presents to discuss lab and ultrasound results.  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  Lab work was completed 3/13/2023.  Iron profile was normal.  Fibrosis score 0.88 consistent with F4 cirrhosis steatosis score 0.75 S3 marked or severe steatosis.  DYE score 0.75 consistent with into-Dye.  Alpha-2-macroglobulin's 372, haptoglobin 25, apolipoprotein  "A1 127 total bili 0.4 , ALT 28, AST 43, cholesterol 188, glucose 107 and triglycerides 555.  AFP normal 5.87 TIM negative, ceruloplasmin 22, CMP noted ALT 29, AST 52, alkaline phosphatase 180.  CBC noted WBC 3.92, hemoglobin 14.3, hematocrit 39.2 platelet count 94,000.  Antimitochondrial antibody less than 20, INR 3.19.  IMPRESSION:  CONCLUSION:  Coarsened echotexture of the liver compatible with hepatocellular  disease.  Increased cortical echogenicity kidneys compatible with medical  renal disease.  Splenomegaly with the spleen measuring 15 cm x 13 cm x 6 cm.     [id number]     Electronically signed by:  Leonard Rose MD  2/15/2023 11:17 PM  CST Workstation: [id number]  Review of Systems  Review of Systems   Constitutional: Negative for activity change, appetite change, chills, diaphoresis, fatigue, fever and unexpected weight change.   Respiratory: Negative for cough and shortness of breath.    Gastrointestinal: Negative for abdominal distention, abdominal pain, anal bleeding, blood in stool, constipation, diarrhea, nausea, rectal pain and vomiting.       /62 (BP Location: Left arm)   Pulse 89   Ht 170.2 cm (67\")   Wt 94.2 kg (207 lb 9.6 oz)   BMI 32.51 kg/m²     Objective      Physical Exam  Constitutional:       General: He is not in acute distress.     Appearance: Normal appearance. He is normal weight. He is not ill-appearing.   HENT:      Head: Normocephalic and atraumatic.   Pulmonary:      Effort: Pulmonary effort is normal.   Abdominal:      General: Abdomen is flat. Bowel sounds are normal. There is no distension.      Palpations: Abdomen is soft. There is no mass.      Tenderness: There is no abdominal tenderness.   Neurological:      Mental Status: He is alert.               The following portions of the patient's history were reviewed and updated as appropriate:   Past Medical History:   Diagnosis Date   • Cancer     skin cancer   • Closed nondisplaced fracture of fifth metatarsal bone of " right foot with routine healing    • Clotting disorder    • Colon polyp 9/18/2018   • Diabetes mellitus    • Fatty liver 02/2023   • GERD (gastroesophageal reflux disease)    • Hyperlipidemia    • Hypertension    • Plantar fasciitis      Past Surgical History:   Procedure Laterality Date   • COLONOSCOPY N/A 09/18/2018    Procedure: COLONOSCOPY;  Surgeon: Judson Pereira DO;  Location: [facility] ENDOSCOPY;  Service: Gastroenterology   • UPPER GASTROINTESTINAL ENDOSCOPY  1994   • VOCAL CORD BIOPSY     • WRIST SURGERY       Family History   Problem Relation Age of Onset   • Cancer Mother    • Osteoporosis Mother    • Psoriasis Mother    • Heart disease Father    • Diabetes Father    • Colon cancer Father    • Colon polyps Father    • Cancer Paternal Grandfather    • Diabetes Paternal Grandfather        No Known Allergies  Social History     Socioeconomic History   • Marital status:    Tobacco Use   • Smoking status: Never   Substance and Sexual Activity   • Alcohol use: No   • Drug use: No   • Sexual activity: Not Currently     Current Medications:  Prior to Admission medications    Medication Sig Start Date End Date Taking? Authorizing Provider   ATENOLOL PO Take 25 mg by mouth Daily.   Yes ProviderEran MD   esomeprazole (nexIUM) 20 MG capsule Take 1 capsule by mouth.   Yes ProviderEran MD   folic acid (FOLVITE) 1 MG tablet Take 1 tablet by mouth Daily. 2/8/23  Yes David Estevez MD   glipizide (GLUCOTROL) 5 MG tablet Take 1 tablet by mouth Daily. 2/3/23  Yes ProviderEran MD   HYDROcodone-acetaminophen (NORCO) 7.5-325 MG per tablet  1/25/23  Yes ProviderEran MD   hydrOXYzine pamoate (VISTARIL) 50 MG capsule  1/24/23  Yes ProviderEran MD   losartan (COZAAR) 50 MG tablet Take 1 tablet by mouth Daily.   Yes ProviderEran MD   metFORMIN (GLUCOPHAGE) 500 MG tablet Take 1 tablet by mouth Daily With Breakfast.   Yes ProviderEran MD   rivaroxaban (XARELTO)  20 MG tablet Take 1 tablet by mouth Daily. 2/23/23  Yes David Estevez MD   Rosuvastatin Calcium (CRESTOR PO) Take 10 mg by mouth Daily.   Yes Eran العراقي MD   tiZANidine (ZANAFLEX) 4 MG tablet Take 1 tablet by mouth Every Night.   Yes Eran العراقي MD   venlafaxine (EFFEXOR) 50 MG tablet Take 1 tablet by mouth Daily.   Yes Eran العراقي MD   aspirin 81 MG EC tablet Daily.  4/13/23  Eran العراقي MD   diclofenac sodium (VOTAREN XR) 100 MG 24 hr tablet diclofenac  mg tablet,extended release 24 hr   TAKE 1 TABLET BY MOUTH ONCE DAILY WITH MEALS  4/13/23  Eran العراقي MD   methylPREDNISolone (MEDROL) 4 MG dose pack  1/25/23 4/13/23  Eran العراقي MD   naproxen (NAPROSYN) 500 MG tablet Every 12 (Twelve) Hours.  4/13/23  Eran العراقي MD   ondansetron ODT (ZOFRAN-ODT) 4 MG disintegrating tablet Place 1 tablet on the tongue Every 6 (Six) Hours As Needed for Nausea or Vomiting. 10/12/20 4/13/23  Bassam Victor,    tobramycin 0.3 % solution ophthalmic solution tobramycin 0.3 % eye drops   INSTILL 2 DROPS INTO RIGHT EYE EVERY 6 HOURS FOR 7 DAYS  4/13/23  Eran العراقي MD     Orders placed during this encounter include:  No orders of the defined types were placed in this encounter.    * Surgery not found *  No orders of the defined types were placed in this encounter.        Review and/or summary of lab tests, radiology, procedures, medications. Review and summary of old records and obtaining of history. The risks and benefits of my recommendations, as well as other treatment options were discussed . Any questions/concerned were answered. Patient voiced understanding and agreement.          This document has been electronically signed by ESTELLE Marlow on April 13, 2023 14:23 CDT                                               Results for orders placed or performed in visit on 03/13/23   CBC Auto Differential    Specimen: Blood   Result Value Ref  Range    WBC 3.92 3.40 - 10.80 10*3/mm3    RBC 3.98 (L) 4.14 - 5.80 10*6/mm3    Hemoglobin 14.3 13.0 - 17.7 g/dL    Hematocrit 39.2 37.5 - 51.0 %    MCV 98.5 (H) 79.0 - 97.0 fL    MCH 35.9 (H) 26.6 - 33.0 pg    MCHC 36.5 (H) 31.5 - 35.7 g/dL    RDW 12.6 12.3 - 15.4 %    RDW-SD 44.9 37.0 - 54.0 fl    MPV 11.0 6.0 - 12.0 fL    Platelets 94 (L) 140 - 450 10*3/mm3    Neutrophil % 55.6 42.7 - 76.0 %    Lymphocyte % 33.4 19.6 - 45.3 %    Monocyte % 8.2 5.0 - 12.0 %    Eosinophil % 2.0 0.3 - 6.2 %    Basophil % 0.5 0.0 - 1.5 %    Immature Grans % 0.3 0.0 - 0.5 %    Neutrophils, Absolute 2.18 1.70 - 7.00 10*3/mm3    Lymphocytes, Absolute 1.31 0.70 - 3.10 10*3/mm3    Monocytes, Absolute 0.32 0.10 - 0.90 10*3/mm3    Eosinophils, Absolute 0.08 0.00 - 0.40 10*3/mm3    Basophils, Absolute 0.02 0.00 - 0.20 10*3/mm3    Immature Grans, Absolute 0.01 0.00 - 0.05 10*3/mm3    nRBC 0.0 0.0 - 0.2 /100 WBC   Alpha-1-antitrypsin    Specimen: Blood   Result Value Ref Range    ALPHA -1 ANTITRYPSIN 173 90 - 200 mg/dL   Mitochondrial antibodies, M2    Specimen: Blood   Result Value Ref Range    Mitochondrial Ab <20.0 0.0 - 20.0 Units   Ceruloplasmin    Specimen: Blood   Result Value Ref Range    Ceruloplasmin 22 16 - 31 mg/dL   AFP tumor marker    Specimen: Blood   Result Value Ref Range    ALPHA-FETOPROTEIN 5.87 0 - 8.3 ng/mL   Anti-smooth muscle antibody titer    Specimen: Blood   Result Value Ref Range    Smooth Muscle Ab 12 0 - 19 Units   Protime-INR    Specimen: Blood   Result Value Ref Range    Protime 32.9 (H) 11.1 - 15.3 Seconds    INR 3.19 (H) 0.80 - 1.20   TIM    Specimen: Blood   Result Value Ref Range    TIM Direct Negative Negative   Comprehensive metabolic panel    Specimen: Blood   Result Value Ref Range    Glucose 109 (H) 65 - 99 mg/dL    BUN 11 8 - 23 mg/dL    Creatinine 0.94 0.76 - 1.27 mg/dL    Sodium 140 136 - 145 mmol/L    Potassium 4.0 3.5 - 5.2 mmol/L    Chloride 103 98 - 107 mmol/L    CO2 20.1 (L) 22.0 - 29.0 mmol/L     Calcium 9.9 8.6 - 10.5 mg/dL    Total Protein 7.0 6.0 - 8.5 g/dL    Albumin 4.1 3.5 - 5.2 g/dL    ALT (SGPT) 29 1 - 41 U/L    AST (SGOT) 52 (H) 1 - 40 U/L    Alkaline Phosphatase 180 (H) 39 - 117 U/L    Total Bilirubin 0.5 0.0 - 1.2 mg/dL    Globulin 2.9 gm/dL    A/G Ratio 1.4 g/dL    BUN/Creatinine Ratio 11.7 7.0 - 25.0    Anion Gap 16.9 (H) 5.0 - 15.0 mmol/L    eGFR 88.9 >60.0 mL/min/1.73   Results for orders placed or performed in visit on 03/13/23   DYE Fibrosure    Specimen: Blood   Result Value Ref Range    Fibrosis Score (References) 0.88 (H) 0.00 - 0.21    Fibrosis Stage (Reference) Comment     Steatosis Score (Reference) 0.75 (H) 0.00 - 0.30    Steatosis Grade (Reference) Comment     DYE Score (Reference) 0.75 (H) 0.25    Dye Grade (Reference) Comment     Height: (Reference) 67 in    Weight: (Reference) 207 LBS    Alpha 2-Macroglobulins, Qn 372 (H) 110 - 276 mg/dL    Haptoglobin 25 (L) 32 - 363 mg/dL    Apolipoprotein A-1 127 101 - 178 mg/dL    Total Bilirubin 0.4 0.0 - 1.2 mg/dL     (H) 0 - 65 IU/L    ALT (SGPT) 28 0 - 55 IU/L    AST (SGOT) P5P (Reference) 43 (H) 0 - 40 IU/L    Cholesterol, Total (Reference) 188 100 - 199 mg/dL    Glucose, Serum (Reference) 107 (H) 70 - 99 mg/dL    Triglycerides 555 (H) 0 - 149 mg/dL    Interpretations: (Reference) Comment     Fibrosis Scoring: Comment     Steatosis Grading (Reference) Comment     Dye Scoring (Reference) Comment     Limitations: (Reference) Comment     Comment (Reference) Comment    Iron Profile    Specimen: Blood   Result Value Ref Range    Iron 86 59 - 158 mcg/dL    Iron Saturation 26 20 - 50 %    Transferrin 224 200 - 360 mg/dL    TIBC 334 298 - 536 mcg/dL   Results for orders placed or performed in visit on 02/08/23   Immature Platelet Fraction    Specimen: Blood   Result Value Ref Range    IPF 3.50 0.90 - 6.50 %   Reflexed PTT-LA Mix    Specimen: Blood   Result Value Ref Range    PTT LA MIX 40.5 0.0 - 40.5 sec   PTT-LA Incub Mix     Specimen: Blood   Result Value Ref Range    Incubated PTT LA Mix 41.9 (H) 0.0 - 40.5 sec   Protein C & S Antigens    Specimen: Blood   Result Value Ref Range    Protein C Antigen 63 60 - 150 %    Protein S Ag, Total 112 60 - 150 %    Protein S Ag, Free 97 61 - 136 %   Protein C & S, Functional    Specimen: Blood   Result Value Ref Range    Protein C-Functional 74 73 - 180 %    Protein S-Functional 54 (L) 63 - 140 %   ~Hexagonal Phase Phospholipid    Specimen: Blood   Result Value Ref Range    Hexagonal Phase Phospholipid 8 0 - 11 sec   Anticardiolipin Antibody, IgG / M, Qn    Specimen: Blood   Result Value Ref Range    Anticardiolipin IgG 31 (H) 0 - 14 GPL U/mL    Anticardiolipin IgM 22 (H) 0 - 12 MPL U/mL   CBC Auto Differential    Specimen: Blood   Result Value Ref Range    WBC 4.07 3.40 - 10.80 10*3/mm3    RBC 4.29 4.14 - 5.80 10*6/mm3    Hemoglobin 14.6 13.0 - 17.7 g/dL    Hematocrit 40.4 37.5 - 51.0 %    MCV 94.2 79.0 - 97.0 fL    MCH 34.0 (H) 26.6 - 33.0 pg    MCHC 36.1 (H) 31.5 - 35.7 g/dL    RDW 11.7 (L) 12.3 - 15.4 %    RDW-SD 40.2 37.0 - 54.0 fl    MPV 10.3 6.0 - 12.0 fL    Platelets 99 (L) 140 - 450 10*3/mm3    Neutrophil % 55.7 42.7 - 76.0 %    Lymphocyte % 34.2 19.6 - 45.3 %    Monocyte % 7.6 5.0 - 12.0 %    Eosinophil % 2.0 0.3 - 6.2 %    Basophil % 0.5 0.0 - 1.5 %    Immature Grans % 0.0 0.0 - 0.5 %    Neutrophils, Absolute 2.27 1.70 - 7.00 10*3/mm3    Lymphocytes, Absolute 1.39 0.70 - 3.10 10*3/mm3    Monocytes, Absolute 0.31 0.10 - 0.90 10*3/mm3    Eosinophils, Absolute 0.08 0.00 - 0.40 10*3/mm3    Basophils, Absolute 0.02 0.00 - 0.20 10*3/mm3    Immature Grans, Absolute 0.00 0.00 - 0.05 10*3/mm3    nRBC 0.0 0.0 - 0.2 /100 WBC     *Note: Due to a large number of results and/or encounters for the requested time period, some results have not been displayed. A complete set of results can be found in Results Review.

## 2023-08-10 ENCOUNTER — LAB (OUTPATIENT)
Dept: LAB | Facility: HOSPITAL | Age: 68
End: 2023-08-10
Payer: MEDICARE

## 2023-08-10 DIAGNOSIS — K76.0 NAFLD (NONALCOHOLIC FATTY LIVER DISEASE): ICD-10-CM

## 2023-08-10 DIAGNOSIS — I10 ESSENTIAL (PRIMARY) HYPERTENSION: ICD-10-CM

## 2023-08-10 LAB
INR PPP: 1.59 (ref 0.8–1.2)
PROTHROMBIN TIME: 18.7 SECONDS (ref 11.1–15.3)

## 2023-08-10 PROCEDURE — 82172 ASSAY OF APOLIPOPROTEIN: CPT

## 2023-08-10 PROCEDURE — 83010 ASSAY OF HAPTOGLOBIN QUANT: CPT

## 2023-08-10 PROCEDURE — 83883 ASSAY NEPHELOMETRY NOT SPEC: CPT

## 2023-08-10 PROCEDURE — 82247 BILIRUBIN TOTAL: CPT

## 2023-08-10 PROCEDURE — 82977 ASSAY OF GGT: CPT

## 2023-08-10 PROCEDURE — 36415 COLL VENOUS BLD VENIPUNCTURE: CPT

## 2023-08-10 PROCEDURE — 84478 ASSAY OF TRIGLYCERIDES: CPT

## 2023-08-10 PROCEDURE — 82465 ASSAY BLD/SERUM CHOLESTEROL: CPT

## 2023-08-10 PROCEDURE — 85610 PROTHROMBIN TIME: CPT

## 2023-08-10 PROCEDURE — 82947 ASSAY GLUCOSE BLOOD QUANT: CPT

## 2023-08-10 PROCEDURE — 84460 ALANINE AMINO (ALT) (SGPT): CPT

## 2023-08-10 PROCEDURE — 84450 TRANSFERASE (AST) (SGOT): CPT

## 2023-08-11 LAB
A2 MACROGLOB SERPL-MCNC: 353 MG/DL (ref 110–276)
ALT SERPL W P-5'-P-CCNC: 33 IU/L (ref 0–55)
APO A-I SERPL-MCNC: 139 MG/DL (ref 101–178)
AST SERPL W P-5'-P-CCNC: 43 IU/L (ref 0–40)
BILIRUB SERPL-MCNC: 0.6 MG/DL (ref 0–1.2)
CHOLEST SERPL-MCNC: 162 MG/DL (ref 100–199)
FIBROSIS SCORING:: ABNORMAL
FIBROSIS STAGE SERPL QL: ABNORMAL
GGT SERPL-CCNC: 112 IU/L (ref 0–65)
GLUCOSE SERPL-MCNC: 123 MG/DL (ref 70–99)
HAPTOGLOB SERPL-MCNC: 38 MG/DL (ref 32–363)
LABORATORY COMMENT REPORT: ABNORMAL
LIVER FIBR SCORE SERPL CALC.FIBROSURE: 0.87 (ref 0–0.21)
LIVER STEATOSIS GRADE SERPL QL: ABNORMAL
LIVER STEATOSIS SCORE SERPL: 0.59 (ref 0–0.4)
NASH GRADE SERPL QL: ABNORMAL
NASH INTERPRETATION SERPL-IMP: ABNORMAL
NASH SCORE SERPL: 0.84 (ref 0–0.25)
NASH SCORING: ABNORMAL
STEATOSIS SCORING: ABNORMAL
TEST PERFORMANCE INFO SPEC: ABNORMAL
TEST PERFORMANCE INFO SPEC: ABNORMAL
TRIGL SERPL-MCNC: 174 MG/DL (ref 0–149)

## 2023-08-14 ENCOUNTER — OFFICE VISIT (OUTPATIENT)
Dept: GASTROENTEROLOGY | Facility: CLINIC | Age: 68
End: 2023-08-14
Payer: MEDICARE

## 2023-08-14 VITALS
DIASTOLIC BLOOD PRESSURE: 70 MMHG | SYSTOLIC BLOOD PRESSURE: 156 MMHG | HEART RATE: 84 BPM | BODY MASS INDEX: 31.64 KG/M2 | WEIGHT: 201.6 LBS | HEIGHT: 67 IN

## 2023-08-14 DIAGNOSIS — K76.0 NAFLD (NONALCOHOLIC FATTY LIVER DISEASE): Primary | ICD-10-CM

## 2023-08-14 PROBLEM — M43.16 SPONDYLOLISTHESIS OF LUMBAR REGION: Status: ACTIVE | Noted: 2023-05-05

## 2023-08-14 PROCEDURE — 1159F MED LIST DOCD IN RCRD: CPT | Performed by: NURSE PRACTITIONER

## 2023-08-14 PROCEDURE — 99213 OFFICE O/P EST LOW 20 MIN: CPT | Performed by: NURSE PRACTITIONER

## 2023-08-14 PROCEDURE — 1160F RVW MEDS BY RX/DR IN RCRD: CPT | Performed by: NURSE PRACTITIONER

## 2023-08-14 RX ORDER — TRAZODONE HYDROCHLORIDE 50 MG/1
TABLET ORAL
COMMUNITY
Start: 2023-08-09

## 2023-08-14 NOTE — PROGRESS NOTES
Chief Complaint   Patient presents with    NAFLD    4 mth f/u        Subjective    Herbert Cowart is a 67 y.o. male. he is here today for follow-up.                                                                  Assessment & Plan                                     1. NAFLD (nonalcoholic fatty liver disease)      Plan; recommend patient continue with diet lifestyle modifications.  Discussed importance of weight loss with ultimate goal to optimize BMI via dietary changes and regular exercise.  Unfortunately there is no FDA approved pharmacological therapy for NAFLD at this time.  Favorable impact of coffee approximately 24 ounces a day on hepatic fibrosis was discussed.  We will follow-up in 3 months with lab work to assess overall liver function for surveillance return to GI office sooner if needed    Follow-up: Return in about 3 months (around 11/14/2023) for Recheck.     HPI  67-year-old male presents for 4-month recheck regarding nonalcoholic fatty liver disease.  Denies any GI complaints abdominal pain change in bowel movement or blood within his stool.  Has monitoring his intake and is down 6 pounds from last check.  Lab work was collected when he was not fasting.  States he has had COVID 4 times and since he had back surgery he has noted his tongue has been numb.  [facility] FibroSure was completed 4 days ago lab results are attached below.  Previous work-up was not suggestive of autoimmune hepatitis overload syndrome or infectious hepatitis.  Ultrasound consistent with hepatocellular disease and splenomegaly.  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    Review of Systems  Review of Systems   Constitutional:  Negative for activity change, appetite change, chills, diaphoresis, fatigue, fever and unexpected weight change.   HENT:  Negative for sore throat and trouble  "swallowing.    Respiratory:  Negative for shortness of breath.    Gastrointestinal:  Negative for abdominal distention, abdominal pain, anal bleeding, blood in stool, constipation, diarrhea, nausea, rectal pain and vomiting.   Musculoskeletal:  Negative for arthralgias.   Skin:  Negative for pallor.   Neurological:  Negative for light-headedness.     /70 (BP Location: Left arm)   Pulse 84   Ht 170.2 cm (67\")   Wt 91.4 kg (201 lb 9.6 oz)   BMI 31.58 kg/mý     Objective      Physical Exam  Constitutional:       General: He is not in acute distress.     Appearance: Normal appearance. He is obese. He is not ill-appearing or toxic-appearing.   HENT:      Head: Normocephalic and atraumatic.   Pulmonary:      Effort: Pulmonary effort is normal.   Abdominal:      General: Abdomen is flat. Bowel sounds are normal. There is no distension.      Palpations: Abdomen is soft. There is no mass.      Tenderness: There is no abdominal tenderness.   Neurological:      Mental Status: He is alert.             The following portions of the patient's history were reviewed and updated as appropriate:   Past Medical History:   Diagnosis Date    Cancer     skin cancer    Closed nondisplaced fracture of fifth metatarsal bone of right foot with routine healing     Clotting disorder     Colon polyp 9/18/2018    Diabetes mellitus     Fatty liver 02/2023    GERD (gastroesophageal reflux disease)     Hyperlipidemia     Hypertension     Plantar fasciitis      Past Surgical History:   Procedure Laterality Date    COLONOSCOPY N/A 09/18/2018    Procedure: COLONOSCOPY;  Surgeon: Judson Pereira DO;  Location: [facility] ENDOSCOPY;  Service: Gastroenterology    UPPER GASTROINTESTINAL ENDOSCOPY  1994    VOCAL CORD BIOPSY      WRIST SURGERY       Family History   Problem Relation Age of Onset    Cancer Mother     Osteoporosis Mother     Psoriasis Mother     Heart disease Father     Diabetes Father     Colon cancer Father     Colon polyps Father  "    Cancer Paternal Grandfather     Diabetes Paternal Grandfather        No Known Allergies  Social History     Socioeconomic History    Marital status:    Tobacco Use    Smoking status: Never   Substance and Sexual Activity    Alcohol use: No    Drug use: No    Sexual activity: Not Currently     Current Medications:  Prior to Admission medications    Medication Sig Start Date End Date Taking? Authorizing Provider   ATENOLOL PO Take 25 mg by mouth Daily.   Yes ProviderEran MD   esomeprazole (nexIUM) 20 MG capsule Take 1 capsule by mouth.   Yes Provider, MD Eran   folic acid (FOLVITE) 1 MG tablet Take 1 tablet by mouth Daily. 2/8/23  Yes David Estevez MD   glipizide (GLUCOTROL) 5 MG tablet Take 1 tablet by mouth Daily. 2/3/23  Yes ProviderEran MD   HYDROcodone-acetaminophen (NORCO) 7.5-325 MG per tablet  1/25/23  Yes ProviderEran MD   hydrOXYzine pamoate (VISTARIL) 50 MG capsule  1/24/23  Yes ProviderEran MD   losartan (COZAAR) 50 MG tablet Take 1 tablet by mouth Daily.   Yes Provider, MD Eran   metFORMIN (GLUCOPHAGE) 500 MG tablet Take 1 tablet by mouth Daily With Breakfast.   Yes Provider, MD Eran   Rosuvastatin Calcium (CRESTOR PO) Take 10 mg by mouth Daily.   Yes Provider, MD Eran   tiZANidine (ZANAFLEX) 4 MG tablet Take 1 tablet by mouth Every Night.   Yes Provider, MD Eran   traZODone (DESYREL) 50 MG tablet  8/9/23  Yes ProviderEran MD   venlafaxine (EFFEXOR) 50 MG tablet Take 1 tablet by mouth Daily.   Yes Provider, MD Eran   Xarelto 20 MG tablet TAKE 1 TABLET BY MOUTH DAILY. 7/18/23  Yes Lizette Shepard APRN     Orders placed during this encounter include:  Orders Placed This Encounter   Procedures    Comprehensive Metabolic Panel     Standing Status:   Future     Standing Expiration Date:   8/14/2024     Order Specific Question:   Release to patient     Answer:   Routine Release [[id number]]    Protime-INR      Standing Status:   Future     Standing Expiration Date:   8/14/2024     Order Specific Question:   Release to patient     Answer:   Routine Release [[id number]]     * Surgery not found *  No orders of the defined types were placed in this encounter.        Review and/or summary of lab tests, radiology, procedures, medications. Review and summary of old records and obtaining of history. The risks and benefits of my recommendations, as well as other treatment options were discussed . Any questions/concerned were answered. Patient voiced understanding and agreement.          This document has been electronically signed by ESTELLE Marlow on August 14, 2023 15:36 CDT                                               Results for orders placed or performed in visit on 08/10/23   DYE Fibrosure    Specimen: Blood   Result Value Ref Range    Fibrosis Score 0.87 (H) 0.00 - 0.21    Fibrosis Stage Comment     Steatosis Score (Reference) 0.59 (H) 0.00 - 0.40    Steatosis Grade (Reference) Comment     DYE Score (Reference) 0.84 (H) 0.00 - 0.25    Dye Grade (Reference) Comment     Methodology: Comment     Alpha 2-Macroglobulins, Qn 353 (H) 110 - 276 mg/dL    Haptoglobin 38 32 - 363 mg/dL    Apolipoprotein A-1 139 101 - 178 mg/dL    Total Bilirubin 0.6 0.0 - 1.2 mg/dL     (H) 0 - 65 IU/L    ALT (SGPT) 33 0 - 55 IU/L    AST (SGOT) P5P (Reference) 43 (H) 0 - 40 IU/L    Cholesterol, Total (Reference) 162 100 - 199 mg/dL    Glucose, Serum (Reference) 123 (H) 70 - 99 mg/dL    Triglycerides 174 (H) 0 - 149 mg/dL    Interpretations: (Reference) Comment     Fibrosis Scoring: Comment     Steatosis Scoring Comment     DYE Scoring Comment     Limitations: Comment     Comment Comment    Protime-INR    Specimen: Blood   Result Value Ref Range    Protime 18.7 (H) 11.1 - 15.3 Seconds    INR 1.59 (H) 0.80 - 1.20   Results for orders placed or performed in visit on 03/13/23   CBC Auto Differential    Specimen: Blood   Result Value Ref  Range    WBC 3.92 3.40 - 10.80 10*3/mm3    RBC 3.98 (L) 4.14 - 5.80 10*6/mm3    Hemoglobin 14.3 13.0 - 17.7 g/dL    Hematocrit 39.2 37.5 - 51.0 %    MCV 98.5 (H) 79.0 - 97.0 fL    MCH 35.9 (H) 26.6 - 33.0 pg    MCHC 36.5 (H) 31.5 - 35.7 g/dL    RDW 12.6 12.3 - 15.4 %    RDW-SD 44.9 37.0 - 54.0 fl    MPV 11.0 6.0 - 12.0 fL    Platelets 94 (L) 140 - 450 10*3/mm3    Neutrophil % 55.6 42.7 - 76.0 %    Lymphocyte % 33.4 19.6 - 45.3 %    Monocyte % 8.2 5.0 - 12.0 %    Eosinophil % 2.0 0.3 - 6.2 %    Basophil % 0.5 0.0 - 1.5 %    Immature Grans % 0.3 0.0 - 0.5 %    Neutrophils, Absolute 2.18 1.70 - 7.00 10*3/mm3    Lymphocytes, Absolute 1.31 0.70 - 3.10 10*3/mm3    Monocytes, Absolute 0.32 0.10 - 0.90 10*3/mm3    Eosinophils, Absolute 0.08 0.00 - 0.40 10*3/mm3    Basophils, Absolute 0.02 0.00 - 0.20 10*3/mm3    Immature Grans, Absolute 0.01 0.00 - 0.05 10*3/mm3    nRBC 0.0 0.0 - 0.2 /100 WBC   Alpha-1-antitrypsin    Specimen: Blood   Result Value Ref Range    ALPHA -1 ANTITRYPSIN 173 90 - 200 mg/dL   Mitochondrial antibodies, M2    Specimen: Blood   Result Value Ref Range    Mitochondrial Ab <20.0 0.0 - 20.0 Units   Ceruloplasmin    Specimen: Blood   Result Value Ref Range    Ceruloplasmin 22 16 - 31 mg/dL   AFP tumor marker    Specimen: Blood   Result Value Ref Range    ALPHA-FETOPROTEIN 5.87 0 - 8.3 ng/mL   Anti-smooth muscle antibody titer    Specimen: Blood   Result Value Ref Range    Smooth Muscle Ab 12 0 - 19 Units   Protime-INR    Specimen: Blood   Result Value Ref Range    Protime 32.9 (H) 11.1 - 15.3 Seconds    INR 3.19 (H) 0.80 - 1.20   TIM    Specimen: Blood   Result Value Ref Range    TIM Direct Negative Negative   Comprehensive metabolic panel    Specimen: Blood   Result Value Ref Range    Glucose 109 (H) 65 - 99 mg/dL    BUN 11 8 - 23 mg/dL    Creatinine 0.94 0.76 - 1.27 mg/dL    Sodium 140 136 - 145 mmol/L    Potassium 4.0 3.5 - 5.2 mmol/L    Chloride 103 98 - 107 mmol/L    CO2 20.1 (L) 22.0 - 29.0 mmol/L     Calcium 9.9 8.6 - 10.5 mg/dL    Total Protein 7.0 6.0 - 8.5 g/dL    Albumin 4.1 3.5 - 5.2 g/dL    ALT (SGPT) 29 1 - 41 U/L    AST (SGOT) 52 (H) 1 - 40 U/L    Alkaline Phosphatase 180 (H) 39 - 117 U/L    Total Bilirubin 0.5 0.0 - 1.2 mg/dL    Globulin 2.9 gm/dL    A/G Ratio 1.4 g/dL    BUN/Creatinine Ratio 11.7 7.0 - 25.0    Anion Gap 16.9 (H) 5.0 - 15.0 mmol/L    eGFR 88.9 >60.0 mL/min/1.73   Results for orders placed or performed in visit on 03/13/23   DYE Fibrosure    Specimen: Blood   Result Value Ref Range    Fibrosis Score (References) 0.88 (H) 0.00 - 0.21    Fibrosis Stage (Reference) Comment     Steatosis Score (Reference) 0.75 (H) 0.00 - 0.30    Steatosis Grade (Reference) Comment     DYE Score (Reference) 0.75 (H) 0.25    Dye Grade (Reference) Comment     Height: (Reference) 67 in    Weight: (Reference) 207 LBS    Alpha 2-Macroglobulins, Qn 372 (H) 110 - 276 mg/dL    Haptoglobin 25 (L) 32 - 363 mg/dL    Apolipoprotein A-1 127 101 - 178 mg/dL    Total Bilirubin 0.4 0.0 - 1.2 mg/dL     (H) 0 - 65 IU/L    ALT (SGPT) 28 0 - 55 IU/L    AST (SGOT) P5P (Reference) 43 (H) 0 - 40 IU/L    Cholesterol, Total (Reference) 188 100 - 199 mg/dL    Glucose, Serum (Reference) 107 (H) 70 - 99 mg/dL    Triglycerides 555 (H) 0 - 149 mg/dL    Interpretations: (Reference) Comment     Fibrosis Scoring: Comment     Steatosis Grading (Reference) Comment     Dye Scoring (Reference) Comment     Limitations: (Reference) Comment     Comment (Reference) Comment      *Note: Due to a large number of results and/or encounters for the requested time period, some results have not been displayed. A complete set of results can be found in Results Review.

## (undated) DEVICE — SINGLE-USE BIOPSY FORCEPS: Brand: RADIAL JAW 4

## (undated) DEVICE — TRAP SXN POLYP QUICKCATCH LF

## (undated) DEVICE — CANN SMPL SOFTECH BIFLO ETCO2 A/M 7FT

## (undated) DEVICE — Device: Brand: DISPOSABLE ELECTROSURGICAL SNARE

## (undated) DEVICE — PAD GRND REM POLYHESIVE A/ DISP